# Patient Record
Sex: MALE | Race: WHITE | NOT HISPANIC OR LATINO
[De-identification: names, ages, dates, MRNs, and addresses within clinical notes are randomized per-mention and may not be internally consistent; named-entity substitution may affect disease eponyms.]

---

## 2022-01-04 PROBLEM — Z00.00 ENCOUNTER FOR PREVENTIVE HEALTH EXAMINATION: Status: ACTIVE | Noted: 2022-01-04

## 2022-01-14 RX ORDER — ASPIRIN ENTERIC COATED TABLETS 81 MG 81 MG/1
81 TABLET, DELAYED RELEASE ORAL
Qty: 30 | Refills: 0 | Status: ACTIVE | COMMUNITY

## 2022-01-14 RX ORDER — METFORMIN HYDROCHLORIDE 500 MG/1
500 TABLET, COATED ORAL TWICE DAILY
Refills: 0 | Status: ACTIVE | COMMUNITY

## 2022-01-14 RX ORDER — ICOSAPENT ETHYL 1000 MG/1
1 CAPSULE ORAL
Qty: 360 | Refills: 3 | Status: ACTIVE | COMMUNITY

## 2022-01-14 RX ORDER — ROSUVASTATIN CALCIUM 40 MG/1
40 TABLET, FILM COATED ORAL
Qty: 90 | Refills: 0 | Status: ACTIVE | COMMUNITY

## 2022-01-14 RX ORDER — EZETIMIBE 10 MG/1
10 TABLET ORAL
Qty: 30 | Refills: 0 | Status: ACTIVE | COMMUNITY

## 2022-01-14 RX ORDER — DAPAGLIFLOZIN 10 MG/1
10 TABLET, FILM COATED ORAL DAILY
Qty: 1 | Refills: 3 | Status: ACTIVE | COMMUNITY

## 2022-01-19 ENCOUNTER — APPOINTMENT (OUTPATIENT)
Dept: CARDIOTHORACIC SURGERY | Facility: CLINIC | Age: 62
End: 2022-01-19
Payer: COMMERCIAL

## 2022-01-19 ENCOUNTER — TRANSCRIPTION ENCOUNTER (OUTPATIENT)
Age: 62
End: 2022-01-19

## 2022-01-19 VITALS
BODY MASS INDEX: 33.34 KG/M2 | TEMPERATURE: 99 F | HEIGHT: 68 IN | HEART RATE: 71 BPM | SYSTOLIC BLOOD PRESSURE: 132 MMHG | DIASTOLIC BLOOD PRESSURE: 83 MMHG | RESPIRATION RATE: 16 BRPM | WEIGHT: 220 LBS | OXYGEN SATURATION: 97 %

## 2022-01-19 DIAGNOSIS — Z82.49 FAMILY HISTORY OF ISCHEMIC HEART DISEASE AND OTHER DISEASES OF THE CIRCULATORY SYSTEM: ICD-10-CM

## 2022-01-19 DIAGNOSIS — Z78.9 OTHER SPECIFIED HEALTH STATUS: ICD-10-CM

## 2022-01-19 DIAGNOSIS — I10 ESSENTIAL (PRIMARY) HYPERTENSION: ICD-10-CM

## 2022-01-19 DIAGNOSIS — E78.5 HYPERLIPIDEMIA, UNSPECIFIED: ICD-10-CM

## 2022-01-19 DIAGNOSIS — Z86.79 PERSONAL HISTORY OF OTHER DISEASES OF THE CIRCULATORY SYSTEM: ICD-10-CM

## 2022-01-19 DIAGNOSIS — I25.10 ATHEROSCLEROTIC HEART DISEASE OF NATIVE CORONARY ARTERY W/OUT ANGINA PECTORIS: ICD-10-CM

## 2022-01-19 DIAGNOSIS — E11.9 TYPE 2 DIABETES MELLITUS W/OUT COMPLICATIONS: ICD-10-CM

## 2022-01-19 DIAGNOSIS — Z86.711 PERSONAL HISTORY OF PULMONARY EMBOLISM: ICD-10-CM

## 2022-01-19 PROCEDURE — 99244 OFF/OP CNSLTJ NEW/EST MOD 40: CPT

## 2022-01-19 RX ORDER — ORAL SEMAGLUTIDE 14 MG/1
14 TABLET ORAL DAILY
Refills: 0 | Status: ACTIVE | COMMUNITY
Start: 2022-01-19

## 2022-01-19 RX ORDER — B-COMPLEX WITH VITAMIN C
TABLET ORAL DAILY
Refills: 0 | Status: ACTIVE | COMMUNITY
Start: 2022-01-19

## 2022-01-19 RX ORDER — INSULIN GLARGINE 300 U/ML
300 INJECTION, SOLUTION SUBCUTANEOUS DAILY
Refills: 0 | Status: ACTIVE | COMMUNITY
Start: 2022-01-19

## 2022-01-19 RX ORDER — ELECTROLYTES/DEXTROSE
SOLUTION, ORAL ORAL
Qty: 90 | Refills: 0 | Status: ACTIVE | COMMUNITY
Start: 2022-01-19

## 2022-01-19 RX ORDER — MULTIVIT-MIN/FOLIC/VIT K/LYCOP 400-300MCG
1000 TABLET ORAL DAILY
Refills: 0 | Status: ACTIVE | COMMUNITY
Start: 2022-01-19

## 2022-01-19 RX ORDER — ADHESIVE TAPE 3"X 2.3 YD
50 MCG TAPE, NON-MEDICATED TOPICAL DAILY
Refills: 0 | Status: ACTIVE | COMMUNITY
Start: 2022-01-19

## 2022-01-19 NOTE — REVIEW OF SYSTEMS
[Feeling Tired] : feeling tired [Shortness Of Breath] : shortness of breath [SOB on Exertion] : shortness of breath during exertion [Negative] : Heme/Lymph [Dizziness] : dizziness

## 2022-01-21 NOTE — CONSULT LETTER
[Dear  ___] : Dear  [unfilled], [FreeTextEntry2] : Dr.Lina Morgan [FreeTextEntry1] : I had the pleasure of seeing your patient, BECKY ESTRADA,in my office today. \par \par We take a multidisciplinary team approach to patient care and consider you, the referring physician, an extension of our team. We will maintain an open line of communication with you throughout your patient's treatment course. \par \par He  is being evaluated for CAD . I have reviewed all of the patient's medical records and diagnostic images at the time of his  office consultation. I have enclosed a copy for your records. \par \par 12/30/21 Cardiac Catheterization revealed distal LT main 10 to 30% stenosis, proximal, proximal to mid LAD 80% stenosis, ostial 1st diagonal 50% stenosis, ostial and proximal circumflex 50% stenosis, proximal OM3 100% stenosis, proximal % stenosis, proximal to mid RCA 50% stenosis, distal RCA 70% stenosis, mid RPDA 70% stenosis. \par \par 12/3/21 Nuclear stress test revealed a large size moderate intensity ischemia involving the apical anterior, apical septal,apical inferior and apical cap. Small size, moderate intensity base to mid inferolateral infarction with mild ayya-infarct ischemia in the surrounding areas. Decreased ejection fraction with stress and transient ischemic dilation were noted. METS 10.5. \par \par I have reviewed the indications for surgery and anatomy of the heart. The patient meets criteria for surgery. I have recommended that the patient is a candidate for a Coronary Artery Bypass Grafting . \par \par Surgery is scheduled for Coronary Artery Bypass Grafting on 2/16/22 .  I will update you on his perioperative status and disposition upon discharge. \par \par I appreciate the opportunity to care for your patient at the  [facility]. If there are any questions or concerns, please call me  at [phone].\par \par Please see my note below. \par \par Sincerely, \par \par \par \par \par Becky Chambers MD\par Director\par [facility]\par Professor \par Cardiovascular & Thoracic Surgery\par [employer] [employer].\par \par \par [facility]:\par Department of Cardiovascular and Thoracic Surgery\par [address] [place] [address], [address]\[facility] Office: [phone]\par Fax: [phone]\par \par [facility]\par  \par Department of Cardiovascular and Thoracic Surgery\par [address] [place] [address], [address]\par Phone: [phone]\[facility] Office: [phone]\par \par \par \par \par \par

## 2022-01-21 NOTE — END OF VISIT
[FreeTextEntry3] : \par I personally scribed for BECKY SANHCEZ on Jan 19 2022  3:30PM . \par \par \par \par \par Physician Attestation:\par Documented by DENNISE SEAY acting as a scribe for BECKY SANCHEZ 01/19/2022 . \par                 All medical record entries made by the Scribe were at my, BECKY SANCHEZ , direction and personally dictated by me on 01/19/2022 . I have reviewed the chart and agree that the record accurately reflects my personal performance of the history, physical exam, assessment and plan\par \par

## 2022-01-21 NOTE — HISTORY OF PRESENT ILLNESS
[FreeTextEntry1] : Mr. ESTRADA is a 61 year old male with  past medical history of T2DM, Hypertension, Hyperlipidemia, PE in the setting of foot surgery in 2019 ( was on Eliquis and completed 6 months of therapy, followed by ) and CAD with complaints of dyspnea on exertion , occasional dizziness and fatigue for 1 year . 12/3/21 Nuclear stress test revealed a large size moderate intensity ischemia involving the apical anterior, apical septal,apical inferior and apical cap. Small size, moderate intensity base to mid inferolateral infarction with mild yaya-infarct ischemia in the surrounding areas. Decreased ejection fraction with stress and transient ischemic dilation were noted. METS 10.5. He subsequently underwent cardiac catheterization on 21 which revealed distal LT main 10 to 30% stenosis, proximal, proximal to mid LAD 80% stenosis, ostial 1st diagonal 50% stenosis, ostial and proximal circumflex 50% stenosis, proximal OM3 100% stenosis, proximal % stenosis, proximal to mid RCA 50% stenosis, distal RCA 70% stenosis, mid RPDA 70% stenosis. He can walk 1 miles/ 2 FOS without shortness of breath. He uses 2 pillows to sleep. He walk the dog 1/2 a mile every day.  He is initially referred by Dr. Jessica Morgan for initial evaluation and management for CAD. \par Family History: Mother  due to CHF at the age of 63 , Brother  due to heart attack at the age of 43 , Sister has PCI with stents \par \par \par COVID VACCINE: Moderna booster on 2021 \par

## 2022-01-21 NOTE — PHYSICAL EXAM
[General Appearance - Alert] : alert [General Appearance - In No Acute Distress] : in no acute distress [General Appearance - Well Nourished] : well nourished [General Appearance - Well Developed] : well developed [Sclera] : the sclera and conjunctiva were normal [PERRL With Normal Accommodation] : pupils were equal in size, round, and reactive to light [Outer Ear] : the ears and nose were normal in appearance [Hearing Threshold Finger Rub Not Randall] : hearing was normal [Both Tympanic Membranes Were Examined] : both tympanic membranes were normal [Neck Appearance] : the appearance of the neck was normal [] : no respiratory distress [Respiration, Rhythm And Depth] : normal respiratory rhythm and effort [Auscultation Breath Sounds / Voice Sounds] : lungs were clear to auscultation bilaterally [Apical Impulse] : the apical impulse was normal [Heart Rate And Rhythm] : heart rate was normal and rhythm regular [Heart Sounds] : normal S1 and S2 [Systolic grade ___/6] : A grade [unfilled]/6 systolic murmur was heard. [Examination Of The Chest] : the chest was normal in appearance [2+] : left 2+ [Breast Appearance] : normal in appearance [Bowel Sounds] : normal bowel sounds [Abdomen Soft] : soft [No CVA Tenderness] : no ~M costovertebral angle tenderness [Abnormal Walk] : normal gait [Involuntary Movements] : no involuntary movements were seen [Skin Color & Pigmentation] : normal skin color and pigmentation [Skin Turgor] : normal skin turgor [No Focal Deficits] : no focal deficits [Oriented To Time, Place, And Person] : oriented to person, place, and time [Impaired Insight] : insight and judgment were intact [Affect] : the affect was normal [Mood] : the mood was normal [Memory Recent] : recent memory was not impaired [Memory Remote] : remote memory was not impaired [FreeTextEntry1] : Deferred

## 2022-01-21 NOTE — ASSESSMENT
[FreeTextEntry1] : Mr. ESTRADA is a 61 year old male with  past medical history of T2DM, Hypertension, Hyperlipidemia, PE in the setting of foot surgery in 2019 ( was on Eliquis and completed 6 months of therapy, followed by ) and CAD with complaints of dyspnea on exertion , occasional dizziness and fatigue for 1 year . 12/3/21 Nuclear stress test revealed a large size moderate intensity ischemia involving the apical anterior, apical septal,apical inferior and apical cap. Small size, moderate intensity base to mid inferolateral infarction with mild yaya-infarct ischemia in the surrounding areas. Decreased ejection fraction with stress and transient ischemic dilation were noted. METS 10.5. He subsequently underwent cardiac catheterization on 21 which revealed distal LT main 10 to 30% stenosis, proximal, proximal to mid LAD 80% stenosis, ostial 1st diagonal 50% stenosis, ostial and proximal circumflex 50% stenosis, proximal OM3 100% stenosis, proximal % stenosis, proximal to mid RCA 50% stenosis, distal RCA 70% stenosis, mid RPDA 70% stenosis. He can walk 1 miles/ 2 FOS without shortness of breath. He uses 2 pillows to sleep. He walk the dog 1/2 a mile every day.  He is initially referred by Dr. Jessica Morgan for initial evaluation and management for CAD. \par Family History: Mother  due to CHF at the age of 63 , Brother  due to heart attack at the age of 43 , Sister has PCI with stents \par \par \par  reviewed the cardiac imaging, medical records and reports with patient and discussed the case. 21 Cardiac Catheterization revealed distal LT main 10 to 30% stenosis, proximal, proximal to mid LAD 80% stenosis, ostial 1st diagonal 50% stenosis, ostial and proximal circumflex 50% stenosis, proximal OM3 100% stenosis, proximal % stenosis, proximal to mid RCA 50% stenosis, distal RCA 70% stenosis, mid RPDA 70% stenosis. \par \par 12/3/21 Nuclear stress test revealed a large size moderate intensity ischemia involving the apical anterior, apical septal,apical inferior and apical cap. Small size, moderate intensity base to mid inferolateral infarction with mild yaya-infarct ischemia in the surrounding areas. Decreased ejection fraction with stress and transient ischemic dilation were noted. METS 10.5. \par \par  discussed the risks , benefits and alternatives to surgery. Risks included but not limited to  bleeding , stroke, Myocardial Infarction, kidney problems,Blood transfusion ,permanent  pacemaker implantation,  infections and death.   quoted a low (< 5%) operative mortality and complication risks.  also discussed the various approaches in detail. feel that the patient will benefit and is a candidate for a Coronary Artery Bypass Grafting . All questions and concerns were addressed and patient agrees to proceed with  surgery on 22. \par \par \par Plan:\par 1) Coronary Artery Bypass Grafting on 22 \par 2)  Stop Aspirin 7 days before scheduled surgery date.\par

## 2022-01-21 NOTE — DATA REVIEWED
[FreeTextEntry1] : 12/30/21 Cardiac Catheterization revealed distal LT main 10 to 30% stenosis, proximal, proximal to mid LAD 80% stenosis, ostial 1st diagonal 50% stenosis, ostial and proximal circumflex 50% stenosis, proximal OM3 100% stenosis, proximal % stenosis, proximal to mid RCA 50% stenosis, distal RCA 70% stenosis, mid RPDA 70% stenosis. \par \par 12/3/21 Nuclear stress test revealed a large size moderate intensity ischemia involving the apical anterior, apical septal,apical inferior and apical cap. Small size, moderate intensity base to mid inferolateral infarction with mild yaya-infarct ischemia in the surrounding areas. Decreased ejection fraction with stress and transient ischemic dilation were noted. METS 10.5.

## 2022-02-14 ENCOUNTER — OUTPATIENT (OUTPATIENT)
Dept: OUTPATIENT SERVICES | Facility: HOSPITAL | Age: 62
LOS: 1 days | End: 2022-02-14
Payer: COMMERCIAL

## 2022-02-14 VITALS
OXYGEN SATURATION: 97 % | HEIGHT: 68 IN | SYSTOLIC BLOOD PRESSURE: 121 MMHG | TEMPERATURE: 99 F | HEART RATE: 80 BPM | RESPIRATION RATE: 18 BRPM | DIASTOLIC BLOOD PRESSURE: 85 MMHG | WEIGHT: 218.04 LBS

## 2022-02-14 DIAGNOSIS — Z98.49 CATARACT EXTRACTION STATUS, UNSPECIFIED EYE: Chronic | ICD-10-CM

## 2022-02-14 DIAGNOSIS — G47.33 OBSTRUCTIVE SLEEP APNEA (ADULT) (PEDIATRIC): ICD-10-CM

## 2022-02-14 DIAGNOSIS — Z87.81 PERSONAL HISTORY OF (HEALED) TRAUMATIC FRACTURE: Chronic | ICD-10-CM

## 2022-02-14 DIAGNOSIS — Z98.890 OTHER SPECIFIED POSTPROCEDURAL STATES: Chronic | ICD-10-CM

## 2022-02-14 DIAGNOSIS — Z11.52 ENCOUNTER FOR SCREENING FOR COVID-19: ICD-10-CM

## 2022-02-14 DIAGNOSIS — Z01.818 ENCOUNTER FOR OTHER PREPROCEDURAL EXAMINATION: ICD-10-CM

## 2022-02-14 DIAGNOSIS — Z29.9 ENCOUNTER FOR PROPHYLACTIC MEASURES, UNSPECIFIED: ICD-10-CM

## 2022-02-14 DIAGNOSIS — Z90.89 ACQUIRED ABSENCE OF OTHER ORGANS: Chronic | ICD-10-CM

## 2022-02-14 DIAGNOSIS — I25.10 ATHEROSCLEROTIC HEART DISEASE OF NATIVE CORONARY ARTERY WITHOUT ANGINA PECTORIS: ICD-10-CM

## 2022-02-14 DIAGNOSIS — E11.9 TYPE 2 DIABETES MELLITUS WITHOUT COMPLICATIONS: ICD-10-CM

## 2022-02-14 LAB
A1C WITH ESTIMATED AVERAGE GLUCOSE RESULT: 8.4 % — HIGH (ref 4–5.6)
ANION GAP SERPL CALC-SCNC: 16 MMOL/L — SIGNIFICANT CHANGE UP (ref 5–17)
BLD GP AB SCN SERPL QL: NEGATIVE — SIGNIFICANT CHANGE UP
BUN SERPL-MCNC: 21 MG/DL — SIGNIFICANT CHANGE UP (ref 7–23)
CALCIUM SERPL-MCNC: 10.4 MG/DL — SIGNIFICANT CHANGE UP (ref 8.4–10.5)
CHLORIDE SERPL-SCNC: 103 MMOL/L — SIGNIFICANT CHANGE UP (ref 96–108)
CO2 SERPL-SCNC: 20 MMOL/L — LOW (ref 22–31)
CREAT SERPL-MCNC: 0.91 MG/DL — SIGNIFICANT CHANGE UP (ref 0.5–1.3)
ESTIMATED AVERAGE GLUCOSE: 194 MG/DL — HIGH (ref 68–114)
GLUCOSE SERPL-MCNC: 312 MG/DL — HIGH (ref 70–99)
HCT VFR BLD CALC: 45.8 % — SIGNIFICANT CHANGE UP (ref 39–50)
HGB BLD-MCNC: 15.1 G/DL — SIGNIFICANT CHANGE UP (ref 13–17)
MCHC RBC-ENTMCNC: 28.3 PG — SIGNIFICANT CHANGE UP (ref 27–34)
MCHC RBC-ENTMCNC: 33 GM/DL — SIGNIFICANT CHANGE UP (ref 32–36)
MCV RBC AUTO: 85.8 FL — SIGNIFICANT CHANGE UP (ref 80–100)
MRSA PCR RESULT.: SIGNIFICANT CHANGE UP
NRBC # BLD: 0 /100 WBCS — SIGNIFICANT CHANGE UP (ref 0–0)
PLATELET # BLD AUTO: 208 K/UL — SIGNIFICANT CHANGE UP (ref 150–400)
POTASSIUM SERPL-MCNC: 4.5 MMOL/L — SIGNIFICANT CHANGE UP (ref 3.5–5.3)
POTASSIUM SERPL-SCNC: 4.5 MMOL/L — SIGNIFICANT CHANGE UP (ref 3.5–5.3)
RBC # BLD: 5.34 M/UL — SIGNIFICANT CHANGE UP (ref 4.2–5.8)
RBC # FLD: 14 % — SIGNIFICANT CHANGE UP (ref 10.3–14.5)
RH IG SCN BLD-IMP: POSITIVE — SIGNIFICANT CHANGE UP
S AUREUS DNA NOSE QL NAA+PROBE: DETECTED
SARS-COV-2 RNA SPEC QL NAA+PROBE: SIGNIFICANT CHANGE UP
SODIUM SERPL-SCNC: 139 MMOL/L — SIGNIFICANT CHANGE UP (ref 135–145)
WBC # BLD: 6.27 K/UL — SIGNIFICANT CHANGE UP (ref 3.8–10.5)
WBC # FLD AUTO: 6.27 K/UL — SIGNIFICANT CHANGE UP (ref 3.8–10.5)

## 2022-02-14 PROCEDURE — 85027 COMPLETE CBC AUTOMATED: CPT

## 2022-02-14 PROCEDURE — C9803: CPT

## 2022-02-14 PROCEDURE — 93880 EXTRACRANIAL BILAT STUDY: CPT

## 2022-02-14 PROCEDURE — 71046 X-RAY EXAM CHEST 2 VIEWS: CPT

## 2022-02-14 PROCEDURE — 71046 X-RAY EXAM CHEST 2 VIEWS: CPT | Mod: 26

## 2022-02-14 PROCEDURE — G0463: CPT

## 2022-02-14 PROCEDURE — 80048 BASIC METABOLIC PNL TOTAL CA: CPT

## 2022-02-14 PROCEDURE — 86900 BLOOD TYPING SEROLOGIC ABO: CPT

## 2022-02-14 PROCEDURE — 83036 HEMOGLOBIN GLYCOSYLATED A1C: CPT

## 2022-02-14 PROCEDURE — U0003: CPT

## 2022-02-14 PROCEDURE — 87641 MR-STAPH DNA AMP PROBE: CPT

## 2022-02-14 PROCEDURE — 86850 RBC ANTIBODY SCREEN: CPT

## 2022-02-14 PROCEDURE — 87640 STAPH A DNA AMP PROBE: CPT

## 2022-02-14 PROCEDURE — U0005: CPT

## 2022-02-14 PROCEDURE — 93880 EXTRACRANIAL BILAT STUDY: CPT | Mod: 26

## 2022-02-14 PROCEDURE — 86901 BLOOD TYPING SEROLOGIC RH(D): CPT

## 2022-02-14 RX ORDER — CEFUROXIME AXETIL 250 MG
1500 TABLET ORAL ONCE
Refills: 0 | Status: COMPLETED | OUTPATIENT
Start: 2022-02-16 | End: 2022-02-16

## 2022-02-14 NOTE — H&P PST ADULT - NSANTHOSAYNRD_GEN_A_CORE
Oriented - self; Oriented - place; Oriented - time No. ERNESTINE screening performed.  STOP BANG Legend: 0-2 = LOW Risk; 3-4 = INTERMEDIATE Risk; 5-8 = HIGH Risk

## 2022-02-14 NOTE — H&P PST ADULT - ASSESSMENT
CAPRINI SCORE [CLOT]    AGE RELATED RISK FACTORS                                                       MOBILITY RELATED FACTORS  [ ] Age 41-60 years                                            (1 Point)                  [ ] Bed rest                                                        (1 Point)  [ x] Age: 61-74 years                                           (2 Points)                 [ ] Plaster cast                                                   (2 Points)  [ ] Age= 75 years                                              (3 Points)                 [ ] Bed bound for more than 72 hours                 (2 Points)    DISEASE RELATED RISK FACTORS                                               GENDER SPECIFIC FACTORS  [ ] Edema in the lower extremities                       (1 Point)                  [ ] Pregnancy                                                     (1 Point)  [ ] Varicose veins                                               (1 Point)                  [ ] Post-partum < 6 weeks                                   (1 Point)             [x ] BMI > 25 Kg/m2                                            (1 Point)                  [ ] Hormonal therapy  or oral contraception          (1 Point)                 [ ] Sepsis (in the previous month)                        (1 Point)                  [ ] History of pregnancy complications                 (1 point)  [ ] Pneumonia or serious lung disease                                               [ ] Unexplained or recurrent                     (1 Point)           (in the previous month)                               (1 Point)  [ ] Abnormal pulmonary function test                     (1 Point)                 SURGERY RELATED RISK FACTORS  [ ] Acute myocardial infarction                              (1 Point)                 [ ]  Section                                             (1 Point)  [ ] Congestive heart failure (in the previous month)  (1 Point)               [ ] Minor surgery                                                  (1 Point)   [ ] Inflammatory bowel disease                             (1 Point)                 [ ] Arthroscopic surgery                                        (2 Points)  [ ] Central venous access                                      (2 Points)                [x ] General surgery lasting more than 45 minutes   (2 Points)       [ ] Stroke (in the previous month)                          (5 Points)               [ ] Elective arthroplasty                                         (5 Points)                                                                                                                                               HEMATOLOGY RELATED FACTORS                                                 TRAUMA RELATED RISK FACTORS  [ ] Prior episodes of VTE                                     (3 Points)                [ ] Fracture of the hip, pelvis, or leg                       (5 Points)  [ ] Positive family history for VTE                         (3 Points)                 [ ] Acute spinal cord injury (in the previous month)  (5 Points)  [ ] Prothrombin 81789 A                                     (3 Points)                 [ ] Paralysis  (less than 1 month)                             (5 Points)  [ ] Factor V Leiden                                             (3 Points)                  [ ] Multiple Trauma within 1 month                        (5 Points)  [ ] Lupus anticoagulants                                     (3 Points)                                                           [ ] Anticardiolipin antibodies                               (3 Points)                                                       [ ] High homocysteine in the blood                      (3 Points)                                             [ ] Other congenital or acquired thrombophilia      (3 Points)                                                [ ] Heparin induced thrombocytopenia                  (3 Points)                                          Total Score [          ]    Caprini Score 0 - 2:  Low Risk, No VTE Prophylaxis required for most patients, encourage ambulation  Caprini Score 3 - 6:  At Risk, pharmacologic VTE prophylaxis is indicated for most patients (in the absence of a contraindication)  Caprini Score Greater than or = 7:  High Risk, pharmacologic VTE prophylaxis is indicated for most patients (in the absence of a contraindication)

## 2022-02-14 NOTE — H&P PST ADULT - HISTORY OF PRESENT ILLNESS
62 yo male     Patient denies previous Covid19 infection/exposure,recent travel,fevers,chills,cough,SOB,loss of sense of smell/taste.  Covid19 PCR performed at [facility] today. 60 yo male [email or web address] prior to scheduled CABG x 3 on 2/16/22 with Dr. Moi Chambers. Pmhx includes HTN, HLD, CAD, DM, ERNESTINE precautions, GERD, history of DVT in LLE (post op ankle surgery in 2019, was on Eliquis x 6 months and then cleared). Reports he feels "fatigued" and ALEJO after working; over the past few months. He denies chest pain, palpitations, SOB at rest, dizziness, syncope. Had a cardiac angiogram on 12/30/2021 which revealed " LT main 10 to 30% stenosis, proximal, proximal to mid LAD 80% stenosis, ostial 1st diagonal 50% stenosis, ostial and proximal circumflex 50% stenosis, proximal OM3 100% stenosis, proximal % stenosis, proximal to mid RCA 50% stenosis, distal RCA 70% stenosis, mid RPDA 70% stenosis." He was recently evaluated by Dr. Chambers and above surgery was recommended.    Patient denies previous Covid19 infection/exposure,recent travel,fevers,chills,cough,SOB,loss of sense of smell/taste.  Covid19 PCR performed at [facility] today.

## 2022-02-14 NOTE — H&P PST ADULT - HEALTH CARE MAINTENANCE
Did not get flu vaccine Doxepin Pregnancy And Lactation Text: This medication is Pregnancy Category C and it isn't known if it is safe during pregnancy. It is also excreted in breast milk and breast feeding isn't recommended.

## 2022-02-14 NOTE — H&P PST ADULT - NSICDXPASTMEDICALHX_GEN_ALL_CORE_FT
PAST MEDICAL HISTORY:  CAD (coronary artery disease)     Diabetes     GERD (gastroesophageal reflux disease)     History of DVT in adulthood 2019, post op in LLE; treated with Eliquis x 6 months, now in ASA 81    HLD (hyperlipidemia)     HTN (hypertension)

## 2022-02-14 NOTE — H&P PST ADULT - NSICDXPASTSURGICALHX_GEN_ALL_CORE_FT
PAST SURGICAL HISTORY:  H/O colonoscopy     H/O fracture of arm age 10, LUE    H/O fracture of foot screw and bone graft in left foot, 2019, complicated w/ DVT in LLE    History of tonsillectomy     S/P cataract extraction and insertion of intraocular lens

## 2022-02-14 NOTE — H&P PST ADULT - FALL HARM RISK - UNIVERSAL INTERVENTIONS
Bed in lowest position, wheels locked, appropriate side rails in place/Call bell, personal items and telephone in reach/Instruct patient to call for assistance before getting out of bed or chair/Non-slip footwear when patient is out of bed/[place] to call system/Physically safe environment - no spills, clutter or unnecessary equipment/Purposeful Proactive Rounding/Room/bathroom lighting operational, light cord in reach

## 2022-02-14 NOTE — H&P PST ADULT - PROBLEM SELECTOR PLAN 1
CABG x 3 on 2/16/2022 with Dr. Moi Chambers.  Pre-op instructions given. Questions answered.  Covid19 PCR performed at [facility] today.  Last dose of aspirin 2/9/22.

## 2022-02-14 NOTE — H&P PST ADULT - PROBLEM SELECTOR PLAN 4
Instructed to hold all antiglycemic meds DOS. Last dose of Farxiga 2/14/22 (patient took this morning), last dose of metformin 2/15/22 AM.

## 2022-02-14 NOTE — H&P PST ADULT - NSICDXFAMILYHX_GEN_ALL_CORE_FT
FAMILY HISTORY:  FH: heart disease, MI (brother,  age 45), CHF (mother,  age 62)    Mother  Still living? Unknown  FH: type 2 diabetes, Age at diagnosis: Age Unknown    Sibling  Still living? Unknown  FH: ovarian cancer, Age at diagnosis: Age Unknown     FAMILY HISTORY:  FH: heart disease, MI (brother,  age 45), CHF (mother,  age 62)    Mother  Still living? Unknown  FH: type 2 diabetes, Age at diagnosis: Age Unknown    Sibling  Still living? Unknown  Family history of other condition, Age at diagnosis: Age Unknown  FH: ovarian cancer, Age at diagnosis: Age Unknown    Child  Still living? Unknown  FH: arrhythmia, Age at diagnosis: Age Unknown

## 2022-02-14 NOTE — H&P PST ADULT - OTHER CARE PROVIDERS
Endocrinologist - Dr. Nay Sun [phone], Cardiologist - Dr. Jessica Morgan, Orthopedist - Dr. Nini Coffman, Dematologist - Tanisha Carney

## 2022-02-15 DIAGNOSIS — Z22.321 CARRIER OR SUSPECTED CARRIER OF METHICILLIN SUSCEPTIBLE STAPHYLOCOCCUS AUREUS: ICD-10-CM

## 2022-02-15 NOTE — PRE-ANESTHESIA EVALUATION ADULT - NSANTHPMHFT_GEN_ALL_CORE
62 yo M w/ PMHx of DM2, HTN, HLD, hx of DVT/PE (circa foot surgery in 2019, GERD, and CAD with complaints of dyspnea on exertion , occasional dizziness and fatigue for 1 year. Underwent nuclear stress test as outpatient that was positive. Cardiac Cath reveals severe 3 vessel disease. Presents today for CABG. 60 yo M w/ PMHx of DM2, HTN, HLD, hx of DVT/PE (circa foot surgery in 2019, GERD, and CAD with complaints of dyspnea on exertion , occasional dizziness and fatigue for 1 year. Underwent nuclear stress test as outpatient that was positive. Cardiac Cath reveals severe 3 vessel disease. Presents today for CABG.    Denies active CP/SOB/Orthopnea  Denies hx of MI/CHF

## 2022-02-15 NOTE — PRE-ANESTHESIA EVALUATION ADULT - NSRADCARDRESULTSFT_GEN_ALL_CORE
12/3/21 Nuclear stress test revealed a large size moderate intensity ischemia involving the apical anterior, apical septal,apical inferior and apical cap. Small size, moderate intensity base to mid inferolateral infarction with mild yaya-infarct ischemia in the surrounding areas. Decreased ejection fraction with stress and transient ischemic dilation were noted. METS 10.5.     12/30/21 cardiac catheterization revealed distal LT main 10 to 30% stenosis, proximal, proximal to mid LAD 80% stenosis, ostial 1st diagonal 50% stenosis, ostial and proximal circumflex 50% stenosis, proximal OM3 100% stenosis, proximal % stenosis, proximal to mid RCA 50% stenosis, distal RCA 70% stenosis, mid RPDA 70% stenosis.

## 2022-02-16 ENCOUNTER — INPATIENT (INPATIENT)
Facility: HOSPITAL | Age: 62
LOS: 3 days | Discharge: HOME HEALTH | DRG: 235 | End: 2022-02-20
Attending: THORACIC SURGERY (CARDIOTHORACIC VASCULAR SURGERY) | Admitting: THORACIC SURGERY (CARDIOTHORACIC VASCULAR SURGERY)
Payer: COMMERCIAL

## 2022-02-16 ENCOUNTER — APPOINTMENT (OUTPATIENT)
Dept: CARDIOTHORACIC SURGERY | Facility: HOSPITAL | Age: 62
End: 2022-02-16

## 2022-02-16 VITALS
HEART RATE: 72 BPM | HEIGHT: 68 IN | TEMPERATURE: 99 F | RESPIRATION RATE: 18 BRPM | SYSTOLIC BLOOD PRESSURE: 152 MMHG | DIASTOLIC BLOOD PRESSURE: 89 MMHG | OXYGEN SATURATION: 97 % | WEIGHT: 220.68 LBS

## 2022-02-16 DIAGNOSIS — Z98.49 CATARACT EXTRACTION STATUS, UNSPECIFIED EYE: Chronic | ICD-10-CM

## 2022-02-16 DIAGNOSIS — Z87.81 PERSONAL HISTORY OF (HEALED) TRAUMATIC FRACTURE: Chronic | ICD-10-CM

## 2022-02-16 DIAGNOSIS — Z90.89 ACQUIRED ABSENCE OF OTHER ORGANS: Chronic | ICD-10-CM

## 2022-02-16 DIAGNOSIS — I25.10 ATHEROSCLEROTIC HEART DISEASE OF NATIVE CORONARY ARTERY WITHOUT ANGINA PECTORIS: ICD-10-CM

## 2022-02-16 DIAGNOSIS — Z98.890 OTHER SPECIFIED POSTPROCEDURAL STATES: Chronic | ICD-10-CM

## 2022-02-16 LAB
ALBUMIN SERPL ELPH-MCNC: 4.2 G/DL — SIGNIFICANT CHANGE UP (ref 3.3–5)
ALP SERPL-CCNC: 26 U/L — LOW (ref 40–120)
ALT FLD-CCNC: 16 U/L — SIGNIFICANT CHANGE UP (ref 10–45)
ANION GAP SERPL CALC-SCNC: 13 MMOL/L — SIGNIFICANT CHANGE UP (ref 5–17)
APTT BLD: 26.1 SEC — LOW (ref 27.5–35.5)
AST SERPL-CCNC: 33 U/L — SIGNIFICANT CHANGE UP (ref 10–40)
BASE EXCESS BLDV CALC-SCNC: -0.6 MMOL/L — SIGNIFICANT CHANGE UP (ref -2–2)
BASE EXCESS BLDV CALC-SCNC: -1.6 MMOL/L — SIGNIFICANT CHANGE UP (ref -2–2)
BASE EXCESS BLDV CALC-SCNC: -2 MMOL/L — SIGNIFICANT CHANGE UP (ref -2–2)
BASE EXCESS BLDV CALC-SCNC: -2.3 MMOL/L — LOW (ref -2–2)
BASE EXCESS BLDV CALC-SCNC: -3.9 MMOL/L — LOW (ref -2–2)
BASOPHILS # BLD AUTO: 0.01 K/UL — SIGNIFICANT CHANGE UP (ref 0–0.2)
BASOPHILS NFR BLD AUTO: 0.1 % — SIGNIFICANT CHANGE UP (ref 0–2)
BILIRUB SERPL-MCNC: 0.7 MG/DL — SIGNIFICANT CHANGE UP (ref 0.2–1.2)
BLOOD GAS VENOUS - CREATININE: SIGNIFICANT CHANGE UP MG/DL (ref 0.5–1.3)
BUN SERPL-MCNC: 20 MG/DL — SIGNIFICANT CHANGE UP (ref 7–23)
CA-I SERPL-SCNC: 1.09 MMOL/L — LOW (ref 1.15–1.33)
CA-I SERPL-SCNC: 1.12 MMOL/L — LOW (ref 1.15–1.33)
CA-I SERPL-SCNC: 1.21 MMOL/L — SIGNIFICANT CHANGE UP (ref 1.15–1.33)
CA-I SERPL-SCNC: 1.59 MMOL/L — HIGH (ref 1.15–1.33)
CA-I SERPL-SCNC: 1.59 MMOL/L — HIGH (ref 1.15–1.33)
CALCIUM SERPL-MCNC: 9 MG/DL — SIGNIFICANT CHANGE UP (ref 8.4–10.5)
CHLORIDE BLDV-SCNC: 106 MMOL/L — SIGNIFICANT CHANGE UP (ref 96–108)
CHLORIDE BLDV-SCNC: 107 MMOL/L — SIGNIFICANT CHANGE UP (ref 96–108)
CHLORIDE BLDV-SCNC: 110 MMOL/L — HIGH (ref 96–108)
CHLORIDE SERPL-SCNC: 110 MMOL/L — HIGH (ref 96–108)
CK MB BLD-MCNC: 7.9 % — HIGH (ref 0–3.5)
CK MB CFR SERPL CALC: 23.6 NG/ML — HIGH (ref 0–6.7)
CK SERPL-CCNC: 300 U/L — HIGH (ref 30–200)
CO2 BLDV-SCNC: 25 MMOL/L — SIGNIFICANT CHANGE UP (ref 22–26)
CO2 BLDV-SCNC: 26 MMOL/L — SIGNIFICANT CHANGE UP (ref 22–26)
CO2 BLDV-SCNC: 26 MMOL/L — SIGNIFICANT CHANGE UP (ref 22–26)
CO2 BLDV-SCNC: 27 MMOL/L — HIGH (ref 22–26)
CO2 BLDV-SCNC: 27 MMOL/L — HIGH (ref 22–26)
CO2 SERPL-SCNC: 21 MMOL/L — LOW (ref 22–31)
CREAT SERPL-MCNC: 0.76 MG/DL — SIGNIFICANT CHANGE UP (ref 0.5–1.3)
EOSINOPHIL # BLD AUTO: 0.02 K/UL — SIGNIFICANT CHANGE UP (ref 0–0.5)
EOSINOPHIL NFR BLD AUTO: 0.2 % — SIGNIFICANT CHANGE UP (ref 0–6)
FIBRINOGEN PPP-MCNC: 248 MG/DL — LOW (ref 290–520)
GAS PNL BLDA: SIGNIFICANT CHANGE UP
GAS PNL BLDV: 137 MMOL/L — SIGNIFICANT CHANGE UP (ref 136–145)
GAS PNL BLDV: 139 MMOL/L — SIGNIFICANT CHANGE UP (ref 136–145)
GAS PNL BLDV: 139 MMOL/L — SIGNIFICANT CHANGE UP (ref 136–145)
GAS PNL BLDV: 140 MMOL/L — SIGNIFICANT CHANGE UP (ref 136–145)
GAS PNL BLDV: 140 MMOL/L — SIGNIFICANT CHANGE UP (ref 136–145)
GAS PNL BLDV: SIGNIFICANT CHANGE UP
GLUCOSE BLDC GLUCOMTR-MCNC: 131 MG/DL — HIGH (ref 70–99)
GLUCOSE BLDC GLUCOMTR-MCNC: 135 MG/DL — HIGH (ref 70–99)
GLUCOSE BLDC GLUCOMTR-MCNC: 144 MG/DL — HIGH (ref 70–99)
GLUCOSE BLDC GLUCOMTR-MCNC: 145 MG/DL — HIGH (ref 70–99)
GLUCOSE BLDC GLUCOMTR-MCNC: 149 MG/DL — HIGH (ref 70–99)
GLUCOSE BLDC GLUCOMTR-MCNC: 149 MG/DL — HIGH (ref 70–99)
GLUCOSE BLDC GLUCOMTR-MCNC: 156 MG/DL — HIGH (ref 70–99)
GLUCOSE BLDC GLUCOMTR-MCNC: 171 MG/DL — HIGH (ref 70–99)
GLUCOSE BLDC GLUCOMTR-MCNC: 174 MG/DL — HIGH (ref 70–99)
GLUCOSE BLDC GLUCOMTR-MCNC: 176 MG/DL — HIGH (ref 70–99)
GLUCOSE BLDC GLUCOMTR-MCNC: 271 MG/DL — HIGH (ref 70–99)
GLUCOSE BLDV-MCNC: 137 MG/DL — HIGH (ref 70–99)
GLUCOSE BLDV-MCNC: 143 MG/DL — HIGH (ref 70–99)
GLUCOSE BLDV-MCNC: 143 MG/DL — HIGH (ref 70–99)
GLUCOSE BLDV-MCNC: 147 MG/DL — HIGH (ref 70–99)
GLUCOSE BLDV-MCNC: 201 MG/DL — HIGH (ref 70–99)
GLUCOSE SERPL-MCNC: 206 MG/DL — HIGH (ref 70–99)
HCO3 BLDV-SCNC: 23 MMOL/L — SIGNIFICANT CHANGE UP (ref 22–29)
HCO3 BLDV-SCNC: 25 MMOL/L — SIGNIFICANT CHANGE UP (ref 22–29)
HCT VFR BLD CALC: 33.8 % — LOW (ref 39–50)
HCT VFR BLDA CALC: 29 % — LOW (ref 39–51)
HCT VFR BLDA CALC: 31 % — LOW (ref 39–51)
HCT VFR BLDA CALC: 38 % — LOW (ref 39–51)
HGB BLD CALC-MCNC: 10.2 G/DL — LOW (ref 12.6–17.4)
HGB BLD CALC-MCNC: 12.6 G/DL — SIGNIFICANT CHANGE UP (ref 12.6–17.4)
HGB BLD CALC-MCNC: 9.7 G/DL — LOW (ref 12.6–17.4)
HGB BLD CALC-MCNC: 9.8 G/DL — LOW (ref 12.6–17.4)
HGB BLD CALC-MCNC: 9.8 G/DL — LOW (ref 12.6–17.4)
HGB BLD-MCNC: 11.4 G/DL — LOW (ref 13–17)
IMM GRANULOCYTES NFR BLD AUTO: 0.6 % — SIGNIFICANT CHANGE UP (ref 0–1.5)
INR BLD: 1.15 RATIO — SIGNIFICANT CHANGE UP (ref 0.88–1.16)
LACTATE BLDV-MCNC: 1 MMOL/L — SIGNIFICANT CHANGE UP (ref 0.7–2)
LACTATE BLDV-MCNC: 1.1 MMOL/L — SIGNIFICANT CHANGE UP (ref 0.7–2)
LACTATE BLDV-MCNC: 1.1 MMOL/L — SIGNIFICANT CHANGE UP (ref 0.7–2)
LACTATE BLDV-MCNC: 1.2 MMOL/L — SIGNIFICANT CHANGE UP (ref 0.7–2)
LACTATE BLDV-MCNC: 1.3 MMOL/L — SIGNIFICANT CHANGE UP (ref 0.7–2)
LYMPHOCYTES # BLD AUTO: 0.75 K/UL — LOW (ref 1–3.3)
LYMPHOCYTES # BLD AUTO: 8.8 % — LOW (ref 13–44)
MAGNESIUM SERPL-MCNC: 2.8 MG/DL — HIGH (ref 1.6–2.6)
MCHC RBC-ENTMCNC: 29.4 PG — SIGNIFICANT CHANGE UP (ref 27–34)
MCHC RBC-ENTMCNC: 33.7 GM/DL — SIGNIFICANT CHANGE UP (ref 32–36)
MCV RBC AUTO: 87.1 FL — SIGNIFICANT CHANGE UP (ref 80–100)
MONOCYTES # BLD AUTO: 0.62 K/UL — SIGNIFICANT CHANGE UP (ref 0–0.9)
MONOCYTES NFR BLD AUTO: 7.3 % — SIGNIFICANT CHANGE UP (ref 2–14)
NEUTROPHILS # BLD AUTO: 7.03 K/UL — SIGNIFICANT CHANGE UP (ref 1.8–7.4)
NEUTROPHILS NFR BLD AUTO: 83 % — HIGH (ref 43–77)
NRBC # BLD: 0 /100 WBCS — SIGNIFICANT CHANGE UP (ref 0–0)
PCO2 BLDV: 47 MMHG — SIGNIFICANT CHANGE UP (ref 42–55)
PCO2 BLDV: 50 MMHG — SIGNIFICANT CHANGE UP (ref 42–55)
PCO2 BLDV: 50 MMHG — SIGNIFICANT CHANGE UP (ref 42–55)
PCO2 BLDV: 51 MMHG — SIGNIFICANT CHANGE UP (ref 42–55)
PCO2 BLDV: 52 MMHG — SIGNIFICANT CHANGE UP (ref 42–55)
PH BLDV: 7.26 — LOW (ref 7.32–7.43)
PH BLDV: 7.3 — LOW (ref 7.32–7.43)
PH BLDV: 7.34 — SIGNIFICANT CHANGE UP (ref 7.32–7.43)
PHOSPHATE SERPL-MCNC: 2.5 MG/DL — SIGNIFICANT CHANGE UP (ref 2.5–4.5)
PLATELET # BLD AUTO: 118 K/UL — LOW (ref 150–400)
PO2 BLDV: 50 MMHG — HIGH (ref 25–45)
PO2 BLDV: 62 MMHG — HIGH (ref 25–45)
PO2 BLDV: 62 MMHG — HIGH (ref 25–45)
PO2 BLDV: 65 MMHG — HIGH (ref 25–45)
PO2 BLDV: 91 MMHG — HIGH (ref 25–45)
POTASSIUM BLDV-SCNC: 3.8 MMOL/L — SIGNIFICANT CHANGE UP (ref 3.5–5.1)
POTASSIUM BLDV-SCNC: 4.3 MMOL/L — SIGNIFICANT CHANGE UP (ref 3.5–5.1)
POTASSIUM BLDV-SCNC: 4.5 MMOL/L — SIGNIFICANT CHANGE UP (ref 3.5–5.1)
POTASSIUM BLDV-SCNC: 5.1 MMOL/L — SIGNIFICANT CHANGE UP (ref 3.5–5.1)
POTASSIUM BLDV-SCNC: 5.1 MMOL/L — SIGNIFICANT CHANGE UP (ref 3.5–5.1)
POTASSIUM SERPL-MCNC: 4.4 MMOL/L — SIGNIFICANT CHANGE UP (ref 3.5–5.3)
POTASSIUM SERPL-SCNC: 4.4 MMOL/L — SIGNIFICANT CHANGE UP (ref 3.5–5.3)
PROT SERPL-MCNC: 5.4 G/DL — LOW (ref 6–8.3)
PROTHROM AB SERPL-ACNC: 13.7 SEC — HIGH (ref 10.6–13.6)
RBC # BLD: 3.88 M/UL — LOW (ref 4.2–5.8)
RBC # FLD: 14.1 % — SIGNIFICANT CHANGE UP (ref 10.3–14.5)
RH IG SCN BLD-IMP: POSITIVE — SIGNIFICANT CHANGE UP
SAO2 % BLDV: 85.4 % — SIGNIFICANT CHANGE UP (ref 67–88)
SAO2 % BLDV: 90 % — HIGH (ref 67–88)
SAO2 % BLDV: 92.2 % — HIGH (ref 67–88)
SAO2 % BLDV: 93 % — HIGH (ref 67–88)
SAO2 % BLDV: 97.8 % — HIGH (ref 67–88)
SODIUM SERPL-SCNC: 144 MMOL/L — SIGNIFICANT CHANGE UP (ref 135–145)
TROPONIN T, HIGH SENSITIVITY RESULT: 348 NG/L — HIGH (ref 0–51)
WBC # BLD: 8.48 K/UL — SIGNIFICANT CHANGE UP (ref 3.8–10.5)
WBC # FLD AUTO: 8.48 K/UL — SIGNIFICANT CHANGE UP (ref 3.8–10.5)

## 2022-02-16 PROCEDURE — 33533 CABG ARTERIAL SINGLE: CPT

## 2022-02-16 PROCEDURE — 33508 ENDOSCOPIC VEIN HARVEST: CPT | Mod: 59

## 2022-02-16 PROCEDURE — 33518 CABG ARTERY-VEIN TWO: CPT

## 2022-02-16 PROCEDURE — 71045 X-RAY EXAM CHEST 1 VIEW: CPT | Mod: 26

## 2022-02-16 PROCEDURE — 99291 CRITICAL CARE FIRST HOUR: CPT | Mod: 24

## 2022-02-16 PROCEDURE — 93010 ELECTROCARDIOGRAM REPORT: CPT

## 2022-02-16 DEVICE — CANNULA VENOUS 2 STAGE THIN FLEX 36/46FR X 1/2" WITH RETURN DIAL: Type: IMPLANTABLE DEVICE | Status: FUNCTIONAL

## 2022-02-16 DEVICE — CANNULA IMA 1MM BLUNT TIP: Type: IMPLANTABLE DEVICE | Status: FUNCTIONAL

## 2022-02-16 DEVICE — CANNULA VESSEL 3MM BLUNT TIP CLEAR 1-WAY VALVE: Type: IMPLANTABLE DEVICE | Status: FUNCTIONAL

## 2022-02-16 DEVICE — CHEST DRAIN THORACIC ARGYLE PVC 36FR STRAIGHT: Type: IMPLANTABLE DEVICE | Status: FUNCTIONAL

## 2022-02-16 DEVICE — IMPLANTABLE DEVICE: Type: IMPLANTABLE DEVICE | Status: FUNCTIONAL

## 2022-02-16 DEVICE — LIGATING CLIPS WECK HORIZON SMALL-WIDE (RED) 24: Type: IMPLANTABLE DEVICE | Status: FUNCTIONAL

## 2022-02-16 DEVICE — LIGATING CLIPS WECK HORIZON MEDIUM (BLUE) 24: Type: IMPLANTABLE DEVICE | Status: FUNCTIONAL

## 2022-02-16 DEVICE — KIT A-LINE 1LUM 20G X 12CM SAFE KIT: Type: IMPLANTABLE DEVICE | Status: FUNCTIONAL

## 2022-02-16 DEVICE — CANNULA AORTIC ROOT 14G X 14CM FLANGED: Type: IMPLANTABLE DEVICE | Status: FUNCTIONAL

## 2022-02-16 DEVICE — CHEST DRAIN THORACIC ARGYLE PVC 32FR RIGHT ANGLE: Type: IMPLANTABLE DEVICE | Status: FUNCTIONAL

## 2022-02-16 DEVICE — PACING WIRE BLUE DARK 2-0 24" SH SKS-3: Type: IMPLANTABLE DEVICE | Status: FUNCTIONAL

## 2022-02-16 DEVICE — KIT CVC 2LUM MAC 9FR CHG: Type: IMPLANTABLE DEVICE | Status: FUNCTIONAL

## 2022-02-16 DEVICE — CANNULA ARTERIAL STRAIGHT 20FR X 3/8" VENTED: Type: IMPLANTABLE DEVICE | Status: FUNCTIONAL

## 2022-02-16 RX ORDER — AMIODARONE HYDROCHLORIDE 400 MG/1
400 TABLET ORAL
Refills: 0 | Status: COMPLETED | OUTPATIENT
Start: 2022-02-16 | End: 2022-02-19

## 2022-02-16 RX ORDER — PANTOPRAZOLE SODIUM 20 MG/1
40 TABLET, DELAYED RELEASE ORAL DAILY
Refills: 0 | Status: DISCONTINUED | OUTPATIENT
Start: 2022-02-16 | End: 2022-02-17

## 2022-02-16 RX ORDER — ASCORBIC ACID 60 MG
500 TABLET,CHEWABLE ORAL
Refills: 0 | Status: DISCONTINUED | OUTPATIENT
Start: 2022-02-16 | End: 2022-02-20

## 2022-02-16 RX ORDER — CHLORHEXIDINE GLUCONATE 213 G/1000ML
1 SOLUTION TOPICAL ONCE
Refills: 0 | Status: DISCONTINUED | OUTPATIENT
Start: 2022-02-16 | End: 2022-02-16

## 2022-02-16 RX ORDER — GABAPENTIN 400 MG/1
300 CAPSULE ORAL ONCE
Refills: 0 | Status: COMPLETED | OUTPATIENT
Start: 2022-02-16 | End: 2022-02-16

## 2022-02-16 RX ORDER — ASPIRIN/CALCIUM CARB/MAGNESIUM 324 MG
81 TABLET ORAL DAILY
Refills: 0 | Status: DISCONTINUED | OUTPATIENT
Start: 2022-02-16 | End: 2022-02-20

## 2022-02-16 RX ORDER — POLYETHYLENE GLYCOL 3350 17 G/17G
17 POWDER, FOR SOLUTION ORAL DAILY
Refills: 0 | Status: DISCONTINUED | OUTPATIENT
Start: 2022-02-17 | End: 2022-02-20

## 2022-02-16 RX ORDER — HYDROMORPHONE HYDROCHLORIDE 2 MG/ML
0.5 INJECTION INTRAMUSCULAR; INTRAVENOUS; SUBCUTANEOUS ONCE
Refills: 0 | Status: DISCONTINUED | OUTPATIENT
Start: 2022-02-16 | End: 2022-02-16

## 2022-02-16 RX ORDER — HYDROMORPHONE HYDROCHLORIDE 2 MG/ML
0.5 INJECTION INTRAMUSCULAR; INTRAVENOUS; SUBCUTANEOUS EVERY 6 HOURS
Refills: 0 | Status: DISCONTINUED | OUTPATIENT
Start: 2022-02-16 | End: 2022-02-17

## 2022-02-16 RX ORDER — CHLORHEXIDINE GLUCONATE 213 G/1000ML
15 SOLUTION TOPICAL EVERY 12 HOURS
Refills: 0 | Status: DISCONTINUED | OUTPATIENT
Start: 2022-02-16 | End: 2022-02-16

## 2022-02-16 RX ORDER — DEXTROSE 50 % IN WATER 50 %
50 SYRINGE (ML) INTRAVENOUS
Refills: 0 | Status: DISCONTINUED | OUTPATIENT
Start: 2022-02-16 | End: 2022-02-16

## 2022-02-16 RX ORDER — OXYCODONE HYDROCHLORIDE 5 MG/1
10 TABLET ORAL EVERY 4 HOURS
Refills: 0 | Status: DISCONTINUED | OUTPATIENT
Start: 2022-02-16 | End: 2022-02-20

## 2022-02-16 RX ORDER — ACETAMINOPHEN 500 MG
1000 TABLET ORAL ONCE
Refills: 0 | Status: COMPLETED | OUTPATIENT
Start: 2022-02-16 | End: 2022-02-16

## 2022-02-16 RX ORDER — ACETAMINOPHEN 500 MG
650 TABLET ORAL EVERY 6 HOURS
Refills: 0 | Status: DISCONTINUED | OUTPATIENT
Start: 2022-02-19 | End: 2022-02-20

## 2022-02-16 RX ORDER — POTASSIUM CHLORIDE 20 MEQ
10 PACKET (EA) ORAL
Refills: 0 | Status: COMPLETED | OUTPATIENT
Start: 2022-02-16 | End: 2022-02-16

## 2022-02-16 RX ORDER — INSULIN GLARGINE 100 [IU]/ML
12 INJECTION, SOLUTION SUBCUTANEOUS
Qty: 0 | Refills: 0 | DISCHARGE

## 2022-02-16 RX ORDER — POTASSIUM CHLORIDE 20 MEQ
10 PACKET (EA) ORAL
Refills: 0 | Status: DISCONTINUED | OUTPATIENT
Start: 2022-02-16 | End: 2022-02-16

## 2022-02-16 RX ORDER — POLYETHYLENE GLYCOL 3350 17 G/17G
17 POWDER, FOR SOLUTION ORAL DAILY
Refills: 0 | Status: DISCONTINUED | OUTPATIENT
Start: 2022-02-16 | End: 2022-02-20

## 2022-02-16 RX ORDER — CHLORHEXIDINE GLUCONATE 213 G/1000ML
5 SOLUTION TOPICAL
Refills: 0 | Status: DISCONTINUED | OUTPATIENT
Start: 2022-02-16 | End: 2022-02-16

## 2022-02-16 RX ORDER — DEXTROSE 50 % IN WATER 50 %
25 SYRINGE (ML) INTRAVENOUS
Refills: 0 | Status: DISCONTINUED | OUTPATIENT
Start: 2022-02-16 | End: 2022-02-16

## 2022-02-16 RX ORDER — NICARDIPINE HYDROCHLORIDE 30 MG/1
5 CAPSULE, EXTENDED RELEASE ORAL
Qty: 40 | Refills: 0 | Status: DISCONTINUED | OUTPATIENT
Start: 2022-02-16 | End: 2022-02-17

## 2022-02-16 RX ORDER — CHLORHEXIDINE GLUCONATE 213 G/1000ML
1 SOLUTION TOPICAL DAILY
Refills: 0 | Status: DISCONTINUED | OUTPATIENT
Start: 2022-02-16 | End: 2022-02-20

## 2022-02-16 RX ORDER — GABAPENTIN 400 MG/1
100 CAPSULE ORAL EVERY 8 HOURS
Refills: 0 | Status: DISCONTINUED | OUTPATIENT
Start: 2022-02-16 | End: 2022-02-20

## 2022-02-16 RX ORDER — SODIUM CHLORIDE 9 MG/ML
1000 INJECTION INTRAMUSCULAR; INTRAVENOUS; SUBCUTANEOUS
Refills: 0 | Status: DISCONTINUED | OUTPATIENT
Start: 2022-02-16 | End: 2022-02-20

## 2022-02-16 RX ORDER — SENNA PLUS 8.6 MG/1
2 TABLET ORAL AT BEDTIME
Refills: 0 | Status: DISCONTINUED | OUTPATIENT
Start: 2022-02-17 | End: 2022-02-20

## 2022-02-16 RX ORDER — LIDOCAINE HCL 20 MG/ML
0.2 VIAL (ML) INJECTION ONCE
Refills: 0 | Status: DISCONTINUED | OUTPATIENT
Start: 2022-02-16 | End: 2022-02-16

## 2022-02-16 RX ORDER — ASPIRIN/CALCIUM CARB/MAGNESIUM 324 MG
300 TABLET ORAL ONCE
Refills: 0 | Status: DISCONTINUED | OUTPATIENT
Start: 2022-02-16 | End: 2022-02-16

## 2022-02-16 RX ORDER — INSULIN HUMAN 100 [IU]/ML
2 INJECTION, SOLUTION SUBCUTANEOUS
Qty: 100 | Refills: 0 | Status: DISCONTINUED | OUTPATIENT
Start: 2022-02-16 | End: 2022-02-18

## 2022-02-16 RX ORDER — ACETAMINOPHEN 500 MG
650 TABLET ORAL EVERY 6 HOURS
Refills: 0 | Status: COMPLETED | OUTPATIENT
Start: 2022-02-16 | End: 2022-02-19

## 2022-02-16 RX ORDER — CEFUROXIME AXETIL 250 MG
1500 TABLET ORAL EVERY 8 HOURS
Refills: 0 | Status: ACTIVE | OUTPATIENT
Start: 2022-02-16 | End: 2022-02-18

## 2022-02-16 RX ORDER — OXYCODONE HYDROCHLORIDE 5 MG/1
5 TABLET ORAL EVERY 4 HOURS
Refills: 0 | Status: DISCONTINUED | OUTPATIENT
Start: 2022-02-16 | End: 2022-02-20

## 2022-02-16 RX ORDER — MEPERIDINE HYDROCHLORIDE 50 MG/ML
25 INJECTION INTRAMUSCULAR; INTRAVENOUS; SUBCUTANEOUS ONCE
Refills: 0 | Status: DISCONTINUED | OUTPATIENT
Start: 2022-02-16 | End: 2022-02-16

## 2022-02-16 RX ORDER — SODIUM CHLORIDE 9 MG/ML
3 INJECTION INTRAMUSCULAR; INTRAVENOUS; SUBCUTANEOUS EVERY 8 HOURS
Refills: 0 | Status: DISCONTINUED | OUTPATIENT
Start: 2022-02-16 | End: 2022-02-16

## 2022-02-16 RX ORDER — DEXMEDETOMIDINE HYDROCHLORIDE IN 0.9% SODIUM CHLORIDE 4 UG/ML
0.5 INJECTION INTRAVENOUS
Qty: 200 | Refills: 0 | Status: DISCONTINUED | OUTPATIENT
Start: 2022-02-16 | End: 2022-02-16

## 2022-02-16 RX ADMIN — INSULIN HUMAN 3 UNIT(S)/HR: 100 INJECTION, SOLUTION SUBCUTANEOUS at 18:02

## 2022-02-16 RX ADMIN — GABAPENTIN 300 MILLIGRAM(S): 400 CAPSULE ORAL at 06:39

## 2022-02-16 RX ADMIN — HYDROMORPHONE HYDROCHLORIDE 0.5 MILLIGRAM(S): 2 INJECTION INTRAMUSCULAR; INTRAVENOUS; SUBCUTANEOUS at 22:22

## 2022-02-16 RX ADMIN — Medication 100 MILLIEQUIVALENT(S): at 21:30

## 2022-02-16 RX ADMIN — HYDROMORPHONE HYDROCHLORIDE 0.5 MILLIGRAM(S): 2 INJECTION INTRAMUSCULAR; INTRAVENOUS; SUBCUTANEOUS at 15:55

## 2022-02-16 RX ADMIN — Medication 1000 MILLIGRAM(S): at 07:00

## 2022-02-16 RX ADMIN — Medication 100 MILLIEQUIVALENT(S): at 22:08

## 2022-02-16 RX ADMIN — CHLORHEXIDINE GLUCONATE 1 APPLICATION(S): 213 SOLUTION TOPICAL at 20:09

## 2022-02-16 RX ADMIN — Medication 100 MILLIEQUIVALENT(S): at 14:44

## 2022-02-16 RX ADMIN — Medication 100 MILLIEQUIVALENT(S): at 16:53

## 2022-02-16 RX ADMIN — HYDROMORPHONE HYDROCHLORIDE 0.5 MILLIGRAM(S): 2 INJECTION INTRAMUSCULAR; INTRAVENOUS; SUBCUTANEOUS at 22:07

## 2022-02-16 RX ADMIN — Medication 100 MILLIEQUIVALENT(S): at 18:57

## 2022-02-16 RX ADMIN — HYDROMORPHONE HYDROCHLORIDE 0.5 MILLIGRAM(S): 2 INJECTION INTRAMUSCULAR; INTRAVENOUS; SUBCUTANEOUS at 15:25

## 2022-02-16 RX ADMIN — Medication 100 MILLIEQUIVALENT(S): at 15:40

## 2022-02-16 RX ADMIN — Medication 100 MILLIEQUIVALENT(S): at 23:09

## 2022-02-16 RX ADMIN — Medication 1000 MILLIGRAM(S): at 06:39

## 2022-02-16 RX ADMIN — PANTOPRAZOLE SODIUM 40 MILLIGRAM(S): 20 TABLET, DELAYED RELEASE ORAL at 18:01

## 2022-02-16 RX ADMIN — Medication 650 MILLIGRAM(S): at 19:34

## 2022-02-16 RX ADMIN — AMIODARONE HYDROCHLORIDE 400 MILLIGRAM(S): 400 TABLET ORAL at 19:03

## 2022-02-16 RX ADMIN — Medication 100 MILLIEQUIVALENT(S): at 16:21

## 2022-02-16 RX ADMIN — SODIUM CHLORIDE 10 MILLILITER(S): 9 INJECTION INTRAMUSCULAR; INTRAVENOUS; SUBCUTANEOUS at 18:03

## 2022-02-16 RX ADMIN — Medication 100 MILLIEQUIVALENT(S): at 19:47

## 2022-02-16 RX ADMIN — Medication 650 MILLIGRAM(S): at 19:04

## 2022-02-16 RX ADMIN — Medication 100 MILLIGRAM(S): at 16:22

## 2022-02-16 RX ADMIN — DEXMEDETOMIDINE HYDROCHLORIDE IN 0.9% SODIUM CHLORIDE 12.5 MICROGRAM(S)/KG/HR: 4 INJECTION INTRAVENOUS at 16:02

## 2022-02-16 RX ADMIN — Medication 500 MILLIGRAM(S): at 19:02

## 2022-02-16 RX ADMIN — Medication 81 MILLIGRAM(S): at 19:42

## 2022-02-16 RX ADMIN — GABAPENTIN 100 MILLIGRAM(S): 400 CAPSULE ORAL at 21:03

## 2022-02-16 NOTE — AIRWAY REMOVAL NOTE  ADULT & PEDS - ARTIFICAL AIRWAY REMOVAL COMMENTS
Written order for extubation verified. The patient was identified by full name and birth date compared to the identification band.  Present during the procedure was  the  RN

## 2022-02-16 NOTE — PATIENT PROFILE ADULT - FUNCTIONAL ASSESSMENT - BASIC MOBILITY 6.
Can she take Contrave with her Cymbalta.She has steady weight gain over the last 3-4 months. 4 = No assist / stand by assistance

## 2022-02-16 NOTE — PROGRESS NOTE ADULT - SUBJECTIVE AND OBJECTIVE BOX
BECKY ESTRADA  MRN-[id number]  Patient is a 61y old  Male who presents with a chief complaint of fatigue, CAD (14 Feb 2022 11:44)    HPI:  60 yo male presents to [facility] prior to scheduled CABG x 3 on 2/16/22 with Dr. Becky Chambers. Pmhx includes HTN, HLD, CAD, DM, ERNESTINE precautions, GERD, history of DVT in LLE (post op ankle surgery in 2019, was on Eliquis x 6 months and then cleared). Reports he feels "fatigued" and ALEJO after working; over the past few months. He denies chest pain, palpitations, SOB at rest, dizziness, syncope. Had a cardiac angiogram on 12/30/2021 which revealed " LT main 10 to 30% stenosis, proximal, proximal to mid LAD 80% stenosis, ostial 1st diagonal 50% stenosis, ostial and proximal circumflex 50% stenosis, proximal OM3 100% stenosis, proximal % stenosis, proximal to mid RCA 50% stenosis, distal RCA 70% stenosis, mid RPDA 70% stenosis." He was recently evaluated by Dr. Chambers and above surgery was recommended.    Patient denies previous Covid19 infection/exposure,recent travel,fevers,chills,cough,SOB,loss of sense of smell/taste.  Covid19 PCR performed at [facility] today. (14 Feb 2022 11:44)      Surgery/Hospital course:  2/16 C3L    Today/Overnight:  - Patient with history of coronary artery disease, subsequently underwent coronary artery bypass grafting procedure earlier today.   - Patient is on IV Nicardipine for history of hypertension   - Patient extubated, continue CPAP trials as tolerated.     Vital Signs Last 24 Hrs  T(C): 36.5 (16 Feb 2022 20:00), Max: 37.2 (16 Feb 2022 18:00)  T(F): 97.7 (16 Feb 2022 20:00), Max: 99 (16 Feb 2022 18:00)  HR: 94 (16 Feb 2022 21:00) (72 - 95)  BP: 152/89 (16 Feb 2022 05:51) (152/89 - 152/89)  BP(mean): --  RR: 24 (16 Feb 2022 21:00) (10 - 24)  SpO2: 99% (16 Feb 2022 21:00) (96% - 100%)  ============================I/O===========================  I&O's Detail    16 Feb 2022 07:01  -  16 Feb 2022 21:51  --------------------------------------------------------  IN:    Dexmedetomidine: 50.5 mL    Insulin: 24.5 mL    IV PiggyBack: 350 mL    Oral Fluid: 200 mL    sodium chloride 0.9%: 80 mL  Total IN: 705 mL    OUT:    Chest Tube (mL): 130 mL    Chest Tube (mL): 75 mL    Indwelling Catheter - Urethral (mL): 1100 mL    Nasogastric/Oral tube (mL): 0 mL    NiCARdipine: 0 mL  Total OUT: 1305 mL    Total NET: -600 mL        ============================ LABS =========================                        11.4   8.48  )-----------( 118      ( 16 Feb 2022 14:03 )             33.8     02-16    144  |  110<H>  |  20  ----------------------------<  206<H>  4.4   |  21<L>  |  0.76    Ca    9.0      16 Feb 2022 14:04  Phos  2.5     02-16  Mg     2.8     02-16    TPro  5.4<L>  /  Alb  4.2  /  TBili  0.7  /  DBili  x   /  AST  33  /  ALT  16  /  AlkPhos  26<L>  02-16    LIVER FUNCTIONS - ( 16 Feb 2022 14:04 )  Alb: 4.2 g/dL / Pro: 5.4 g/dL / ALK PHOS: 26 U/L / ALT: 16 U/L / AST: 33 U/L / GGT: x           PT/INR - ( 16 Feb 2022 14:03 )   PT: 13.7 sec;   INR: 1.15 ratio         PTT - ( 16 Feb 2022 14:03 )  PTT:26.1 sec  ABG - ( 16 Feb 2022 21:03 )  pH, Arterial: 7.40  pH, Blood: x     /  pCO2: 35    /  pO2: 97    / HCO3: 22    / Base Excess: -2.6  /  SaO2: 98.7                ======================Micro/Rad/Cardio=================  CXR: Reviewed  Echo: Reviewed  ======================================================  PAST MEDICAL & SURGICAL HISTORY:  Diabetes    HTN (hypertension)    HLD (hyperlipidemia)    CAD (coronary artery disease)    GERD (gastroesophageal reflux disease)    History of DVT in adulthood  2019, post op in LLE; treated with Eliquis x 6 months, now in ASA 81    H/O fracture of foot  screw and bone graft in left foot, 2019, complicated w/ DVT in LLE    H/O fracture of arm  age 10, LUE    History of tonsillectomy    H/O colonoscopy    S/P cataract extraction and insertion of intraocular lens      ========================ASSESSMENT ================  CAD w/ prior stents S/P CABG x3  HTN  Diabetes Mellitus   Acute blood loss anemia   Thrombocytopenia   Hyperchloremia   Obesity OHS / ERNESTINE     Plan:  ====================== NEUROLOGY=====================  Continue to monitor neuro status per ICU protocol.   Tylenol, Gabapentin, PRN Oxycodone, and PRN Dilaudid for pain management.     acetaminophen     Tablet .. 650 milliGRAM(s) Oral every 6 hours  gabapentin 100 milliGRAM(s) Oral every 8 hours  HYDROmorphone  Injectable 0.5 milliGRAM(s) IV Push every 6 hours PRN Severe Pain (7 - 10)  oxyCODONE    IR 5 milliGRAM(s) Oral every 4 hours PRN Moderate Pain (4 - 6)  oxyCODONE    IR 10 milliGRAM(s) Oral every 4 hours PRN Severe Pain (7 - 10)    ==================== RESPIRATORY======================  Patient with a history of obstructive sleep apnea initially on full ventilator support, subsequently weaned to pressure support and extubated while closely monitoring respiratory rate, breathing pattern, pulse ox monitoring, and intermittent blood gas analysis.   Increased concern for atelectasis due to obesity related restrictive lung disease.     Mechanical Ventilation:  Mode: CPAP with PS  FiO2: 40  PEEP: 5  PS: 5      ====================CARDIOVASCULAR==================  Patient with history of coronary artery disease with prior stents, subsequently underwent coronary artery bypass grafting x3 procedure earlier today.  Invasive hemodynamic monitoring, assess perfusion indices.   Intra-operative DEDE on 2/16 revealed EF of 55-60%, Normal left ventricular systolic function, Right ventricular enlargement with normal right ventricular systolic function, and Minimal tricuspid  regurgitation.   ASA continued for graft occlusion-thromboembolism prophylaxis.   Continue with Amiodarone for atrial fibrillation prophylaxis.   In addition requiring IV Nicardipine for history of Hypertension.   Temporary VVI 50 back up pacing wires in place.     aspirin enteric coated 81 milliGRAM(s) Oral daily  aMIOdarone    Tablet 400 milliGRAM(s) Oral two times a day  niCARdipine Infusion 5 mG/Hr (25 mL/Hr) IV Continuous <Continuous>    ===================HEMATOLOGIC/ONC ===================  Anemia and thrombocytopenia due to acute blood loss status post 1 unit of autologous packed red blood cells, no current plan for transfusion. Continue to monitor hemoglobin and hematocrit levels.     ===================== RENAL =========================  Patient with hyperchloremia, optimize renal perfusion with adequate volume resuscitation and continued monitoring of urine output, fluid balance, electrolytes, and BUN/Creatinine.     ==================== GASTROINTESTINAL===================  NPO after recent procedure, advance diet as tolerated. Continue Protonix for stress ulcer prophylaxis. Bowel regimen with Miralax.     ascorbic acid 500 milliGRAM(s) Oral two times a day  pantoprazole  Injectable 40 milliGRAM(s) IV Push daily  polyethylene glycol 3350 17 Gram(s) Oral daily  potassium chloride  10 mEq/50 mL IVPB 10 milliEquivalent(s) IV Intermittent every 1 hour  potassium chloride  10 mEq/50 mL IVPB 10 milliEquivalent(s) IV Intermittent every 1 hour  potassium chloride  10 mEq/50 mL IVPB 10 milliEquivalent(s) IV Intermittent every 1 hour  potassium chloride  10 mEq/50 mL IVPB 10 milliEquivalent(s) IV Intermittent every 1 hour  sodium chloride 0.9%. 1000 milliLiter(s) (10 mL/Hr) IV Continuous <Continuous>    =======================    ENDOCRINE  =====================  Metabolic stability, history of diabetes mellitus required an IV regular Insulin drip while following serial glucose levels to help achieve and maintain euglycemia.      dextrose 50% Injectable 50 milliLiter(s) IV Push every 15 minutes  dextrose 50% Injectable 25 milliLiter(s) IV Push every 15 minutes  insulin regular Infusion 3 Unit(s)/Hr (3 mL/Hr) IV Continuous <Continuous>    ========================INFECTIOUS DISEASE================  Afebrile, white blood cells within normal limits. Monitor temperature and trend white blood cells. Continue Cefuroxime for perioperative antibiotic coverage.     cefuroxime  IVPB 1500 milliGRAM(s) IV Intermittent every 8 hours      Patient requires continuous monitoring with bedside rhythm monitoring, pulse oximetry monitoring, and continuous central venous and arterial pressure monitoring; and intermittent blood gas analysis.  Care plan discussed with ICU care team.    Patient remained critical, at risk for life threatening decompensation.   I have spent 35 minutes providing acute care with multiple re-evaluations throughout the evening.     By signing my name below, I, Magnolia Mae, attest that this documentation has been prepared under the direction and in the presence of MYKE Nance.  Electronically signed: Kiran Ring, 02-16-22 @ 21:51    I, MYKE Nance, personally performed the services described in this documentation. All medical record entries made by the roibe were at my direction and in my presence. I have reviewed the chart and agree that the record reflects my personal performance and is accurate and complete  Electronically signed: MYKE Nance, 02-16-22 @ 21:51       BECKY ESTRADA  MRN-[id number]  Patient is a 61y old  Male who presents with a chief complaint of fatigue, CAD (14 Feb 2022 11:44)    HPI:  60 yo male presents to [facility] prior to scheduled CABG x 3 on 2/16/22 with Dr. Becky Chambers. Pmhx includes HTN, HLD, CAD, DM, ERNESTINE precautions, GERD, history of DVT in LLE (post op ankle surgery in 2019, was on Eliquis x 6 months and then cleared). Reports he feels "fatigued" and ALEJO after working; over the past few months. He denies chest pain, palpitations, SOB at rest, dizziness, syncope. Had a cardiac angiogram on 12/30/2021 which revealed " LT main 10 to 30% stenosis, proximal, proximal to mid LAD 80% stenosis, ostial 1st diagonal 50% stenosis, ostial and proximal circumflex 50% stenosis, proximal OM3 100% stenosis, proximal % stenosis, proximal to mid RCA 50% stenosis, distal RCA 70% stenosis, mid RPDA 70% stenosis." He was recently evaluated by Dr. Chambers and above surgery was recommended.    Patient denies previous Covid19 infection/exposure,recent travel,fevers,chills,cough,SOB,loss of sense of smell/taste.  Covid19 PCR performed at [facility] today. (14 Feb 2022 11:44)      Surgery/Hospital course:  2/16 C3L    Today/Overnight:  -extubated at 5:30pm  -pain control prn  -off pressors       Vital Signs Last 24 Hrs  T(C): 36.5 (16 Feb 2022 20:00), Max: 37.2 (16 Feb 2022 18:00)  T(F): 97.7 (16 Feb 2022 20:00), Max: 99 (16 Feb 2022 18:00)  HR: 94 (16 Feb 2022 21:00) (72 - 95)  BP: 152/89 (16 Feb 2022 05:51) (152/89 - 152/89)  BP(mean): --  RR: 24 (16 Feb 2022 21:00) (10 - 24)  SpO2: 99% (16 Feb 2022 21:00) (96% - 100%)  ============================I/O===========================  I&O's Detail    16 Feb 2022 07:01  -  16 Feb 2022 21:51  --------------------------------------------------------  IN:    Dexmedetomidine: 50.5 mL    Insulin: 24.5 mL    IV PiggyBack: 350 mL    Oral Fluid: 200 mL    sodium chloride 0.9%: 80 mL  Total IN: 705 mL    OUT:    Chest Tube (mL): 130 mL    Chest Tube (mL): 75 mL    Indwelling Catheter - Urethral (mL): 1100 mL    Nasogastric/Oral tube (mL): 0 mL    NiCARdipine: 0 mL  Total OUT: 1305 mL    Total NET: -600 mL        ============================ LABS =========================                        11.4   8.48  )-----------( 118      ( 16 Feb 2022 14:03 )             33.8     02-16    144  |  110<H>  |  20  ----------------------------<  206<H>  4.4   |  21<L>  |  0.76    Ca    9.0      16 Feb 2022 14:04  Phos  2.5     02-16  Mg     2.8     02-16    TPro  5.4<L>  /  Alb  4.2  /  TBili  0.7  /  DBili  x   /  AST  33  /  ALT  16  /  AlkPhos  26<L>  02-16    LIVER FUNCTIONS - ( 16 Feb 2022 14:04 )  Alb: 4.2 g/dL / Pro: 5.4 g/dL / ALK PHOS: 26 U/L / ALT: 16 U/L / AST: 33 U/L / GGT: x           PT/INR - ( 16 Feb 2022 14:03 )   PT: 13.7 sec;   INR: 1.15 ratio         PTT - ( 16 Feb 2022 14:03 )  PTT:26.1 sec  ABG - ( 16 Feb 2022 21:03 )  pH, Arterial: 7.40  pH, Blood: x     /  pCO2: 35    /  pO2: 97    / HCO3: 22    / Base Excess: -2.6  /  SaO2: 98.7                ======================Micro/Rad/Cardio=================  CXR: Reviewed  Echo: Reviewed  ======================================================  PAST MEDICAL & SURGICAL HISTORY:  Diabetes    HTN (hypertension)    HLD (hyperlipidemia)    CAD (coronary artery disease)    GERD (gastroesophageal reflux disease)    History of DVT in adulthood  2019, post op in LLE; treated with Eliquis x 6 months, now in ASA 82    H/O fracture of foot  screw and bone graft in left foot, 2019, complicated w/ DVT in LLE    H/O fracture of arm  age 10, LUE    History of tonsillectomy    H/O colonoscopy    S/P cataract extraction and insertion of intraocular lens      ========================ASSESSMENT ================  CAD w/ prior stents S/P CABG x3  HTN  Diabetes Mellitus   Acute blood loss anemia   Thrombocytopenia   Hyperchloremia   Obesity OHS / ERNESTINE     Plan:  ====================== NEUROLOGY=====================  Continue to monitor neuro status per ICU protocol.   Tylenol, Gabapentin, PRN Oxycodone, and PRN Dilaudid for pain management.     acetaminophen     Tablet .. 650 milliGRAM(s) Oral every 6 hours  gabapentin 100 milliGRAM(s) Oral every 8 hours  HYDROmorphone  Injectable 0.5 milliGRAM(s) IV Push every 6 hours PRN Severe Pain (7 - 10)  oxyCODONE    IR 5 milliGRAM(s) Oral every 4 hours PRN Moderate Pain (4 - 6)  oxyCODONE    IR 10 milliGRAM(s) Oral every 4 hours PRN Severe Pain (7 - 10)    ==================== RESPIRATORY======================  extubated at 5:30pm  sp02>92%  monitor chest tube outputs   AM cxr       ====================CARDIOVASCULAR==================  Patient with history of coronary artery disease with prior stents, subsequently underwent coronary artery bypass grafting x3 procedure earlier today.  Invasive hemodynamic monitoring, assess perfusion indices.   Intra-operative DEDE on 2/16 revealed EF of 55-60%, Normal left ventricular systolic function, Right ventricular enlargement with normal right ventricular systolic function, and Minimal tricuspid  regurgitation.   ASA continued for graft occlusion-thromboembolism prophylaxis.   Continue with Amiodarone for atrial fibrillation prophylaxis.   In addition requiring IV Nicardipine for history of Hypertension.   Temporary VVI 50 back up pacing wires in place.     aspirin enteric coated 81 milliGRAM(s) Oral daily  aMIOdarone    Tablet 400 milliGRAM(s) Oral two times a day  niCARdipine Infusion 5 mG/Hr (25 mL/Hr) IV Continuous <Continuous>    ===================HEMATOLOGIC/ONC ===================  Anemia and thrombocytopenia due to acute blood loss status post 1 unit of autologous packed red blood cells, no current plan for transfusion. Continue to monitor hemoglobin and hematocrit levels.     ===================== RENAL =========================  Patient with hyperchloremia, optimize renal perfusion with adequate volume resuscitation and continued monitoring of urine output, fluid balance, electrolytes, and BUN/Creatinine.     ==================== GASTROINTESTINAL===================  NPO after recent procedure, advance diet as tolerated. Continue Protonix for stress ulcer prophylaxis. Bowel regimen with Miralax.     ascorbic acid 500 milliGRAM(s) Oral two times a day  pantoprazole  Injectable 40 milliGRAM(s) IV Push daily  polyethylene glycol 3350 17 Gram(s) Oral daily  potassium chloride  10 mEq/50 mL IVPB 10 milliEquivalent(s) IV Intermittent every 1 hour  potassium chloride  10 mEq/50 mL IVPB 10 milliEquivalent(s) IV Intermittent every 1 hour  potassium chloride  10 mEq/50 mL IVPB 10 milliEquivalent(s) IV Intermittent every 1 hour  potassium chloride  10 mEq/50 mL IVPB 10 milliEquivalent(s) IV Intermittent every 1 hour  sodium chloride 0.9%. 1000 milliLiter(s) (10 mL/Hr) IV Continuous <Continuous>    =======================    ENDOCRINE  =====================  Metabolic stability, history of diabetes mellitus required an IV regular Insulin drip while following serial glucose levels to help achieve and maintain euglycemia.      dextrose 50% Injectable 50 milliLiter(s) IV Push every 15 minutes  dextrose 50% Injectable 25 milliLiter(s) IV Push every 15 minutes  insulin regular Infusion 3 Unit(s)/Hr (3 mL/Hr) IV Continuous <Continuous>    ========================INFECTIOUS DISEASE================  Afebrile, white blood cells within normal limits. Monitor temperature and trend white blood cells. Continue Cefuroxime for perioperative antibiotic coverage.     cefuroxime  IVPB 1500 milliGRAM(s) IV Intermittent every 8 hours      Patient requires continuous monitoring with bedside rhythm monitoring, pulse oximetry monitoring, and continuous central venous and arterial pressure monitoring; and intermittent blood gas analysis.  Care plan discussed with ICU care team.    Patient remained critical, at risk for life threatening decompensation.   I have spent 35 minutes providing acute care with multiple re-evaluations throughout the evening.     By signing my name below, I, Magnolia Mae, attest that this documentation has been prepared under the direction and in the presence of MYKE Nance.  Electronically signed: Kiran Ring, 02-16-22 @ 21:51    I, MYKE Nance, personally performed the services described in this documentation. All medical record entries made by the roibe were at my direction and in my presence. I have reviewed the chart and agree that the record reflects my personal performance and is accurate and complete  Electronically signed: MYKE Nance, 02-16-22 @ 21:51

## 2022-02-16 NOTE — BRIEF OPERATIVE NOTE - OPERATION/FINDINGS
CAD, CABGx3 (LIMA-LAD, SVG-Diag, SVG-LPL)  No blood products, patient received one unit of their own blood back at end of case  EF 60%

## 2022-02-16 NOTE — PRE-OP CHECKLIST - TEMPERATURE IN CELSIUS (DEGREES C)
Klarissa Brown  is here for a completion of her perioperative paperwork. she  Is scheduled to undergo 1.  Arthroscopic anterior cruciate ligament reconstruction, Right knee, hamstring autograft (7899)  2.  Arthroscopic chondroplasty, Right knee (41714)  3.  Arthroscopic partial synovectomy, Right knee (94017) on 10/10/2019.  She is a healthy individual and does not need clearance for this procedure.     Risks, indications and benefits of the surgical procedure were discussed with the patient. All questions with regard to surgery, rehab, expected return to functional activities, activities of daily living and recreational endeavors were answered to her satisfaction.    Patient was informed and understands the risks of surgery are greater for patients with a current condition or history of heart disease, obesity, clotting disorders, recurrent infections, steroid use, current or past smoking, and factors such as sedentary lifestyle and noncompliance with medications, therapy or follow-up. The degree of the increased risk is hard to estimate with any degree of precision.    Once no other questions were asked, a brief history and physical exam was then performed.    PAST MEDICAL HISTORY:   Past Medical History:   Diagnosis Date    HSV (herpes simplex virus) anogenital infection      PAST SURGICAL HISTORY:   Past Surgical History:   Procedure Laterality Date    HERNIA REPAIR      as 3 year old     FAMILY HISTORY:   Family History   Problem Relation Age of Onset    Sleep apnea Mother     Arthritis Mother         injuries from gymnastics; hypermobile joints    Anxiety disorder Mother     Depression Mother     Hypertension Father     Bipolar disorder Maternal Grandmother     Other Brother         hypomania but not bipolar d/o?    Cancer Neg Hx     Diabetes Neg Hx     Heart attack Neg Hx     Ovarian cancer Neg Hx     Breast cancer Neg Hx     Colon cancer Neg Hx      SOCIAL HISTORY:   Social History      Socioeconomic History    Marital status:      Spouse name: Not on file    Number of children: Not on file    Years of education: Not on file    Highest education level: Not on file   Occupational History    Not on file   Social Needs    Financial resource strain: Not on file    Food insecurity:     Worry: Not on file     Inability: Not on file    Transportation needs:     Medical: Not on file     Non-medical: Not on file   Tobacco Use    Smoking status: Never Smoker    Smokeless tobacco: Never Used   Substance and Sexual Activity    Alcohol use: Yes     Comment: 3-5 drinks/week    Drug use: No    Sexual activity: Yes     Partners: Male     Birth control/protection: None   Lifestyle    Physical activity:     Days per week: Not on file     Minutes per session: Not on file    Stress: Not on file   Relationships    Social connections:     Talks on phone: Not on file     Gets together: Not on file     Attends [employer] service: Not on file     Active member of club or organization: Not on file     Attends meetings of clubs or organizations: Not on file     Relationship status: Not on file   Other Topics Concern    Not on file   Social History Narrative    Not on file       MEDICATIONS:   Current Outpatient Medications:     acyclovir (ZOVIRAX) 400 MG tablet, TAKE 1 TABLET (400 MG TOTAL) BY MOUTH 4 (FOUR) TIMES DAILY. FOR 5 DAYS, Disp: , Rfl: 12    celecoxib (CELEBREX) 200 MG capsule, Take 1 capsule (200 mg total) by mouth 2 (two) times daily., Disp: 60 capsule, Rfl: 6  ALLERGIES: Review of patient's allergies indicates:  No Known Allergies    Review of Systems   Constitution: Negative. Negative for chills, fever and night sweats.   HENT: Negative for congestion and headaches.    Eyes: Negative for blurred vision, left vision loss and right vision loss.   Cardiovascular: Negative for chest pain and syncope.   Respiratory: Negative for cough and shortness of breath.    Endocrine: Negative for  polydipsia, polyphagia and polyuria.   Hematologic/Lymphatic: Negative for bleeding problem. Does not bruise/bleed easily.   Skin: Negative for dry skin, itching and rash.   Musculoskeletal: Negative for falls and muscle weakness.   Gastrointestinal: Negative for abdominal pain and bowel incontinence.   Genitourinary: Negative for bladder incontinence and nocturia.   Neurological: Negative for disturbances in coordination, loss of balance and seizures.   Psychiatric/Behavioral: Negative for depression. The patient does not have insomnia.    Allergic/Immunologic: Negative for hives and persistent infections.     PHYSICAL EXAM:  GEN: A&Ox3, WD WN NAD  HEENT: WNL  CHEST: CTAB, no W/R/R  HEART: RRR, no M/R/G   ABD: Soft, NT ND, BS x4 QUADS  MS: Refer to previous note for detailed MS exam  NEURO: CN II-XII intact       The surgical consent was then reviewed with the patient, who agreed with all the contents of the consent form and it was signed. she was then given the [facility] surgery packet to bring with her to [facility] for the anesthesia portion of her perioperative paperwork.     PHYSICAL THERAPY:  She was also instructed regarding physical therapy and will begin POD # 1-3.at [facility].    POST OP CARE:instructions were reviewed including care of the wound and dressing after surgery and when she can shower.     PAIN MANAGEMENT: Klarissa Brown was also given a pain management regime, which includes the TENS unit given to her by Artur Perez along with the education required for its use. She was also instructed regarding the Polar ice unit that will be in place after surgery and her postoperative pain medications.     PAIN MEDICATION:  Percocet 10/325mg 1 po q 4-6 hours prn pain  Phenergan 25 mg one p.o. q.4-6 hours p.r.n. nausea and vomiting.  Celebrex 200 mg BID x 6 weeks post op  ASA 352mg once a day x 6 weeks post op  TO BE DELIVERED POST-OP BEDSIDE    Patient denies history of seizures.     Patient was  instructed to buy and take:  Aspirin 325mg daily x 6 weeks for DVT prophylaxis starting on the evening after surgery.  Patient will also use bilateral TEDs on lower extremities, SCDs during surgery, and early ambulation post-op. If the patient was previously taking 81mg baby aspirin, they were told to not take it will using the above stated aspirin and to restart the 81mg aspirin after completion of the aspirin dose.       Patient was also told to buy over the counter Prilosec medication and take it once daily for GI protection as long as they are taking NSAIDs or Aspirin.    DVT prophylaxis was discussed with the patient today including risk factors for developing DVTs and history of DVTs. The patient was asked if any specific recommendations were given from the doctor/s that did pre-operative surgical clearance.      Patient was asked if they were taking or using OCP pills or devices. If they answered yes, then they were instructed to stop using OCPs at this pre-operative appointment until 2 months post-op to help prevent DVT development. They understand that there are other forms of birth control that do not involve hormones. They expressed understanding that ignoring/not following this instruction could result in a DVT which could turn into a deadly pulmonary embolism.      The patient was told that narcotic pain medications may make them drowsy and instructions were given to not sign legal documents, drive or operate heavy machinery, cars, or equipment while under the influence of narcotic medications.     As there were no other questions to be asked, she was given my business card along with Lázaro Cody MD business card if she has any questions or concerns prior to surgery or in the postop period.      37

## 2022-02-16 NOTE — PATIENT PROFILE ADULT - HEALTH LITERACY
Continue Regimen: Epiduo at night Detail Level: Detailed Otc Regimen: Discussed clarisonic brush or AHA/BHA Cleanser to exfoliate Plan: Encouraged removal of nose piercing no

## 2022-02-17 DIAGNOSIS — Z95.1 PRESENCE OF AORTOCORONARY BYPASS GRAFT: ICD-10-CM

## 2022-02-17 LAB
ALBUMIN SERPL ELPH-MCNC: 4.3 G/DL — SIGNIFICANT CHANGE UP (ref 3.3–5)
ALP SERPL-CCNC: 27 U/L — LOW (ref 40–120)
ALT FLD-CCNC: 18 U/L — SIGNIFICANT CHANGE UP (ref 10–45)
ANION GAP SERPL CALC-SCNC: 12 MMOL/L — SIGNIFICANT CHANGE UP (ref 5–17)
APTT BLD: 21.9 SEC — LOW (ref 27.5–35.5)
AST SERPL-CCNC: 36 U/L — SIGNIFICANT CHANGE UP (ref 10–40)
BILIRUB SERPL-MCNC: 0.5 MG/DL — SIGNIFICANT CHANGE UP (ref 0.2–1.2)
BUN SERPL-MCNC: 18 MG/DL — SIGNIFICANT CHANGE UP (ref 7–23)
CALCIUM SERPL-MCNC: 9 MG/DL — SIGNIFICANT CHANGE UP (ref 8.4–10.5)
CHLORIDE SERPL-SCNC: 109 MMOL/L — HIGH (ref 96–108)
CO2 SERPL-SCNC: 19 MMOL/L — LOW (ref 22–31)
CREAT SERPL-MCNC: 0.69 MG/DL — SIGNIFICANT CHANGE UP (ref 0.5–1.3)
GAS PNL BLDA: SIGNIFICANT CHANGE UP
GLUCOSE BLDC GLUCOMTR-MCNC: 130 MG/DL — HIGH (ref 70–99)
GLUCOSE BLDC GLUCOMTR-MCNC: 130 MG/DL — HIGH (ref 70–99)
GLUCOSE BLDC GLUCOMTR-MCNC: 131 MG/DL — HIGH (ref 70–99)
GLUCOSE BLDC GLUCOMTR-MCNC: 134 MG/DL — HIGH (ref 70–99)
GLUCOSE BLDC GLUCOMTR-MCNC: 135 MG/DL — HIGH (ref 70–99)
GLUCOSE BLDC GLUCOMTR-MCNC: 144 MG/DL — HIGH (ref 70–99)
GLUCOSE BLDC GLUCOMTR-MCNC: 155 MG/DL — HIGH (ref 70–99)
GLUCOSE BLDC GLUCOMTR-MCNC: 164 MG/DL — HIGH (ref 70–99)
GLUCOSE BLDC GLUCOMTR-MCNC: 170 MG/DL — HIGH (ref 70–99)
GLUCOSE BLDC GLUCOMTR-MCNC: 175 MG/DL — HIGH (ref 70–99)
GLUCOSE BLDC GLUCOMTR-MCNC: 178 MG/DL — HIGH (ref 70–99)
GLUCOSE BLDC GLUCOMTR-MCNC: 181 MG/DL — HIGH (ref 70–99)
GLUCOSE BLDC GLUCOMTR-MCNC: 193 MG/DL — HIGH (ref 70–99)
GLUCOSE BLDC GLUCOMTR-MCNC: 197 MG/DL — HIGH (ref 70–99)
GLUCOSE BLDC GLUCOMTR-MCNC: 200 MG/DL — HIGH (ref 70–99)
GLUCOSE BLDC GLUCOMTR-MCNC: 202 MG/DL — HIGH (ref 70–99)
GLUCOSE BLDC GLUCOMTR-MCNC: 204 MG/DL — HIGH (ref 70–99)
GLUCOSE BLDC GLUCOMTR-MCNC: 205 MG/DL — HIGH (ref 70–99)
GLUCOSE BLDC GLUCOMTR-MCNC: 207 MG/DL — HIGH (ref 70–99)
GLUCOSE BLDC GLUCOMTR-MCNC: 209 MG/DL — HIGH (ref 70–99)
GLUCOSE BLDC GLUCOMTR-MCNC: 229 MG/DL — HIGH (ref 70–99)
GLUCOSE SERPL-MCNC: 140 MG/DL — HIGH (ref 70–99)
HCT VFR BLD CALC: 34.7 % — LOW (ref 39–50)
HGB BLD-MCNC: 11.7 G/DL — LOW (ref 13–17)
INR BLD: 1.1 RATIO — SIGNIFICANT CHANGE UP (ref 0.88–1.16)
MAGNESIUM SERPL-MCNC: 2.2 MG/DL — SIGNIFICANT CHANGE UP (ref 1.6–2.6)
MCHC RBC-ENTMCNC: 28.8 PG — SIGNIFICANT CHANGE UP (ref 27–34)
MCHC RBC-ENTMCNC: 33.7 GM/DL — SIGNIFICANT CHANGE UP (ref 32–36)
MCV RBC AUTO: 85.5 FL — SIGNIFICANT CHANGE UP (ref 80–100)
NRBC # BLD: 0 /100 WBCS — SIGNIFICANT CHANGE UP (ref 0–0)
PHOSPHATE SERPL-MCNC: 3.3 MG/DL — SIGNIFICANT CHANGE UP (ref 2.5–4.5)
PLATELET # BLD AUTO: 139 K/UL — LOW (ref 150–400)
POTASSIUM SERPL-MCNC: 4.4 MMOL/L — SIGNIFICANT CHANGE UP (ref 3.5–5.3)
POTASSIUM SERPL-SCNC: 4.4 MMOL/L — SIGNIFICANT CHANGE UP (ref 3.5–5.3)
PROT SERPL-MCNC: 5.9 G/DL — LOW (ref 6–8.3)
PROTHROM AB SERPL-ACNC: 13.1 SEC — SIGNIFICANT CHANGE UP (ref 10.6–13.6)
RBC # BLD: 4.06 M/UL — LOW (ref 4.2–5.8)
RBC # FLD: 14 % — SIGNIFICANT CHANGE UP (ref 10.3–14.5)
SODIUM SERPL-SCNC: 140 MMOL/L — SIGNIFICANT CHANGE UP (ref 135–145)
WBC # BLD: 10 K/UL — SIGNIFICANT CHANGE UP (ref 3.8–10.5)
WBC # FLD AUTO: 10 K/UL — SIGNIFICANT CHANGE UP (ref 3.8–10.5)

## 2022-02-17 PROCEDURE — 71045 X-RAY EXAM CHEST 1 VIEW: CPT | Mod: 26

## 2022-02-17 PROCEDURE — 99291 CRITICAL CARE FIRST HOUR: CPT

## 2022-02-17 PROCEDURE — 93010 ELECTROCARDIOGRAM REPORT: CPT

## 2022-02-17 RX ORDER — HYDROMORPHONE HYDROCHLORIDE 2 MG/ML
1 INJECTION INTRAMUSCULAR; INTRAVENOUS; SUBCUTANEOUS ONCE
Refills: 0 | Status: DISCONTINUED | OUTPATIENT
Start: 2022-02-17 | End: 2022-02-17

## 2022-02-17 RX ORDER — SIMVASTATIN 20 MG/1
40 TABLET, FILM COATED ORAL AT BEDTIME
Refills: 0 | Status: DISCONTINUED | OUTPATIENT
Start: 2022-02-17 | End: 2022-02-20

## 2022-02-17 RX ORDER — METOPROLOL TARTRATE 50 MG
25 TABLET ORAL EVERY 8 HOURS
Refills: 0 | Status: DISCONTINUED | OUTPATIENT
Start: 2022-02-17 | End: 2022-02-19

## 2022-02-17 RX ORDER — INSULIN LISPRO 100/ML
VIAL (ML) SUBCUTANEOUS
Refills: 0 | Status: DISCONTINUED | OUTPATIENT
Start: 2022-02-17 | End: 2022-02-18

## 2022-02-17 RX ORDER — METOPROLOL TARTRATE 50 MG
25 TABLET ORAL
Refills: 0 | Status: DISCONTINUED | OUTPATIENT
Start: 2022-02-17 | End: 2022-02-17

## 2022-02-17 RX ORDER — INSULIN GLARGINE 100 [IU]/ML
15 INJECTION, SOLUTION SUBCUTANEOUS AT BEDTIME
Refills: 0 | Status: DISCONTINUED | OUTPATIENT
Start: 2022-02-17 | End: 2022-02-18

## 2022-02-17 RX ORDER — FENOFIBRATE,MICRONIZED 130 MG
145 CAPSULE ORAL DAILY
Refills: 0 | Status: DISCONTINUED | OUTPATIENT
Start: 2022-02-17 | End: 2022-02-20

## 2022-02-17 RX ORDER — METOCLOPRAMIDE HCL 10 MG
10 TABLET ORAL EVERY 8 HOURS
Refills: 0 | Status: COMPLETED | OUTPATIENT
Start: 2022-02-17 | End: 2022-02-17

## 2022-02-17 RX ORDER — INSULIN LISPRO 100/ML
5 VIAL (ML) SUBCUTANEOUS
Refills: 0 | Status: DISCONTINUED | OUTPATIENT
Start: 2022-02-17 | End: 2022-02-18

## 2022-02-17 RX ORDER — ENOXAPARIN SODIUM 100 MG/ML
40 INJECTION SUBCUTANEOUS DAILY
Refills: 0 | Status: DISCONTINUED | OUTPATIENT
Start: 2022-02-17 | End: 2022-02-20

## 2022-02-17 RX ORDER — ATORVASTATIN CALCIUM 80 MG/1
80 TABLET, FILM COATED ORAL AT BEDTIME
Refills: 0 | Status: DISCONTINUED | OUTPATIENT
Start: 2022-02-17 | End: 2022-02-17

## 2022-02-17 RX ORDER — HYDROMORPHONE HYDROCHLORIDE 2 MG/ML
0.5 INJECTION INTRAMUSCULAR; INTRAVENOUS; SUBCUTANEOUS ONCE
Refills: 0 | Status: DISCONTINUED | OUTPATIENT
Start: 2022-02-17 | End: 2022-02-17

## 2022-02-17 RX ADMIN — Medication 100 MILLIGRAM(S): at 16:40

## 2022-02-17 RX ADMIN — Medication 650 MILLIGRAM(S): at 11:44

## 2022-02-17 RX ADMIN — Medication 650 MILLIGRAM(S): at 00:02

## 2022-02-17 RX ADMIN — SENNA PLUS 2 TABLET(S): 8.6 TABLET ORAL at 22:21

## 2022-02-17 RX ADMIN — HYDROMORPHONE HYDROCHLORIDE 0.5 MILLIGRAM(S): 2 INJECTION INTRAMUSCULAR; INTRAVENOUS; SUBCUTANEOUS at 04:05

## 2022-02-17 RX ADMIN — OXYCODONE HYDROCHLORIDE 10 MILLIGRAM(S): 5 TABLET ORAL at 21:40

## 2022-02-17 RX ADMIN — Medication 500 MILLIGRAM(S): at 05:38

## 2022-02-17 RX ADMIN — HYDROMORPHONE HYDROCHLORIDE 0.5 MILLIGRAM(S): 2 INJECTION INTRAMUSCULAR; INTRAVENOUS; SUBCUTANEOUS at 00:56

## 2022-02-17 RX ADMIN — Medication 145 MILLIGRAM(S): at 11:43

## 2022-02-17 RX ADMIN — ENOXAPARIN SODIUM 40 MILLIGRAM(S): 100 INJECTION SUBCUTANEOUS at 11:43

## 2022-02-17 RX ADMIN — Medication 650 MILLIGRAM(S): at 00:32

## 2022-02-17 RX ADMIN — Medication 10: at 11:45

## 2022-02-17 RX ADMIN — Medication 100 MILLIGRAM(S): at 00:02

## 2022-02-17 RX ADMIN — Medication 650 MILLIGRAM(S): at 12:15

## 2022-02-17 RX ADMIN — Medication 650 MILLIGRAM(S): at 05:38

## 2022-02-17 RX ADMIN — GABAPENTIN 100 MILLIGRAM(S): 400 CAPSULE ORAL at 14:07

## 2022-02-17 RX ADMIN — HYDROMORPHONE HYDROCHLORIDE 0.5 MILLIGRAM(S): 2 INJECTION INTRAMUSCULAR; INTRAVENOUS; SUBCUTANEOUS at 04:20

## 2022-02-17 RX ADMIN — Medication 650 MILLIGRAM(S): at 17:49

## 2022-02-17 RX ADMIN — HYDROMORPHONE HYDROCHLORIDE 0.5 MILLIGRAM(S): 2 INJECTION INTRAMUSCULAR; INTRAVENOUS; SUBCUTANEOUS at 00:42

## 2022-02-17 RX ADMIN — Medication 500 MILLIGRAM(S): at 17:49

## 2022-02-17 RX ADMIN — INSULIN GLARGINE 15 UNIT(S): 100 INJECTION, SOLUTION SUBCUTANEOUS at 22:22

## 2022-02-17 RX ADMIN — GABAPENTIN 100 MILLIGRAM(S): 400 CAPSULE ORAL at 22:21

## 2022-02-17 RX ADMIN — Medication 10 MILLIGRAM(S): at 22:22

## 2022-02-17 RX ADMIN — HYDROMORPHONE HYDROCHLORIDE 0.5 MILLIGRAM(S): 2 INJECTION INTRAMUSCULAR; INTRAVENOUS; SUBCUTANEOUS at 09:33

## 2022-02-17 RX ADMIN — CHLORHEXIDINE GLUCONATE 1 APPLICATION(S): 213 SOLUTION TOPICAL at 22:09

## 2022-02-17 RX ADMIN — Medication 81 MILLIGRAM(S): at 11:44

## 2022-02-17 RX ADMIN — Medication 650 MILLIGRAM(S): at 17:59

## 2022-02-17 RX ADMIN — HYDROMORPHONE HYDROCHLORIDE 1 MILLIGRAM(S): 2 INJECTION INTRAMUSCULAR; INTRAVENOUS; SUBCUTANEOUS at 05:42

## 2022-02-17 RX ADMIN — Medication 25 MILLIGRAM(S): at 22:21

## 2022-02-17 RX ADMIN — Medication 100 MILLIGRAM(S): at 07:54

## 2022-02-17 RX ADMIN — GABAPENTIN 100 MILLIGRAM(S): 400 CAPSULE ORAL at 05:38

## 2022-02-17 RX ADMIN — Medication 5 UNIT(S): at 11:46

## 2022-02-17 RX ADMIN — HYDROMORPHONE HYDROCHLORIDE 1 MILLIGRAM(S): 2 INJECTION INTRAMUSCULAR; INTRAVENOUS; SUBCUTANEOUS at 06:00

## 2022-02-17 RX ADMIN — AMIODARONE HYDROCHLORIDE 400 MILLIGRAM(S): 400 TABLET ORAL at 05:38

## 2022-02-17 RX ADMIN — SIMVASTATIN 40 MILLIGRAM(S): 20 TABLET, FILM COATED ORAL at 22:22

## 2022-02-17 RX ADMIN — Medication 650 MILLIGRAM(S): at 06:08

## 2022-02-17 RX ADMIN — Medication 10 MILLIGRAM(S): at 04:44

## 2022-02-17 RX ADMIN — AMIODARONE HYDROCHLORIDE 400 MILLIGRAM(S): 400 TABLET ORAL at 17:49

## 2022-02-17 RX ADMIN — OXYCODONE HYDROCHLORIDE 10 MILLIGRAM(S): 5 TABLET ORAL at 14:37

## 2022-02-17 RX ADMIN — OXYCODONE HYDROCHLORIDE 10 MILLIGRAM(S): 5 TABLET ORAL at 20:38

## 2022-02-17 RX ADMIN — Medication 25 MILLIGRAM(S): at 05:38

## 2022-02-17 RX ADMIN — HYDROMORPHONE HYDROCHLORIDE 0.5 MILLIGRAM(S): 2 INJECTION INTRAMUSCULAR; INTRAVENOUS; SUBCUTANEOUS at 09:50

## 2022-02-17 RX ADMIN — Medication 10 MILLIGRAM(S): at 14:07

## 2022-02-17 RX ADMIN — OXYCODONE HYDROCHLORIDE 10 MILLIGRAM(S): 5 TABLET ORAL at 14:32

## 2022-02-17 RX ADMIN — POLYETHYLENE GLYCOL 3350 17 GRAM(S): 17 POWDER, FOR SOLUTION ORAL at 11:44

## 2022-02-17 NOTE — PROGRESS NOTE ADULT - SUBJECTIVE AND OBJECTIVE BOX
VITAL SIGNS    Telemetry:      Vital Signs Last 24 Hrs  T(C): 36.9 (22 @ 12:40), Max: 37.3 (22 @ 04:00)  T(F): 98.4 (22 @ 12:40), Max: 99.1 (22 @ 04:00)  HR: 88 (22 @ 12:40) (84 - 97)  BP: --  RR: 18 (22 @ 12:40) (10 - 27)  SpO2: 98% (22 @ 12:40) (95% - 100%)                    07:01  -   @ 07:00  --------------------------------------------------------  IN: 1042 mL / OUT: 2065 mL / NET: -1023 mL     @ 07:01  -   @ 14:40  --------------------------------------------------------  IN: 620.5 mL / OUT: 435 mL / NET: 185.5 mL          Daily     Daily Weight in k.8 (2022 00:00)            CAPILLARY BLOOD GLUCOSE  155 (2022 07:00)  164 (2022 06:00)  134 (2022 05:00)  135 (2022 04:00)  130 (2022 03:00)  131 (2022 02:00)  130 (2022 01:00)  131 (2022 00:00)  135 (2022 23:00)  144 (2022 22:00)  149 (2022 21:00)  149 (2022 20:00)  145 (2022 19:00)  156 (2022 18:15)  171 (2022 17:00)  176 (2022 16:00)  174 (2022 15:00)      POCT Blood Glucose.: 229 mg/dL (2022 13:58)  POCT Blood Glucose.: 207 mg/dL (2022 12:40)  POCT Blood Glucose.: 204 mg/dL (2022 11:42)  POCT Blood Glucose.: 205 mg/dL (2022 11:03)  POCT Blood Glucose.: 197 mg/dL (2022 09:53)  POCT Blood Glucose.: 175 mg/dL (2022 09:01)  POCT Blood Glucose.: 144 mg/dL (2022 07:51)  POCT Blood Glucose.: 155 mg/dL (2022 06:44)  POCT Blood Glucose.: 164 mg/dL (2022 06:00)  POCT Blood Glucose.: 134 mg/dL (2022 04:56)  POCT Blood Glucose.: 135 mg/dL (2022 03:58)  POCT Blood Glucose.: 130 mg/dL (2022 02:56)  POCT Blood Glucose.: 131 mg/dL (2022 01:59)  POCT Blood Glucose.: 130 mg/dL (2022 01:00)  POCT Blood Glucose.: 131 mg/dL (2022 23:56)  POCT Blood Glucose.: 135 mg/dL (2022 23:06)  POCT Blood Glucose.: 144 mg/dL (2022 22:14)  POCT Blood Glucose.: 149 mg/dL (2022 20:57)  POCT Blood Glucose.: 149 mg/dL (2022 19:56)  POCT Blood Glucose.: 145 mg/dL (2022 19:04)  POCT Blood Glucose.: 156 mg/dL (2022 18:21)  POCT Blood Glucose.: 171 mg/dL (2022 17:08)  POCT Blood Glucose.: 176 mg/dL (2022 16:10)  POCT Blood Glucose.: 174 mg/dL (2022 15:10)            Drains:     MS         [  ] Drainage:                 L Pleural  [  ]  Drainage:                R Pleural  [  ]  Drainage:    Pacing Wires        [  ]   Settings:                                  Isolated  [  ]    Coumadin    [ ] YES          [  ]      NO                                   PHYSICAL EXAM        Neurology: alert and oriented x 3, nonfocal, no gross deficits  CV : s1 s2 RRR  Sternal Wound :  CDI , Stable  Lungs: cta  Abdomen: soft, nontender, nondistended, positive bowel sounds, last bowel movement                       chest tubes  :    voiding / montgomery - sbd         Extremities:      edema   /  -   calve tenderness ,    L leg  /  R leg  incisions cdi          acetaminophen     Tablet .. 650 milliGRAM(s) Oral every 6 hours  aMIOdarone    Tablet 400 milliGRAM(s) Oral two times a day  ascorbic acid 500 milliGRAM(s) Oral two times a day  aspirin enteric coated 81 milliGRAM(s) Oral daily  cefuroxime  IVPB 1500 milliGRAM(s) IV Intermittent every 8 hours  chlorhexidine 2% Cloths 1 Application(s) Topical daily  enoxaparin Injectable 40 milliGRAM(s) SubCutaneous daily  fenofibrate Tablet 145 milliGRAM(s) Oral daily  gabapentin 100 milliGRAM(s) Oral every 8 hours  HYDROmorphone  Injectable 0.5 milliGRAM(s) IV Push every 6 hours PRN  insulin glargine Injectable (LANTUS) 15 Unit(s) SubCutaneous at bedtime  insulin lispro (ADMELOG) corrective regimen sliding scale   SubCutaneous three times a day before meals  insulin lispro Injectable (ADMELOG) 5 Unit(s) SubCutaneous before breakfast  insulin lispro Injectable (ADMELOG) 5 Unit(s) SubCutaneous before lunch  insulin lispro Injectable (ADMELOG) 5 Unit(s) SubCutaneous before dinner  insulin regular Infusion 2 Unit(s)/Hr IV Continuous <Continuous>  metoclopramide Injectable 10 milliGRAM(s) IV Push every 8 hours  metoprolol tartrate 25 milliGRAM(s) Oral two times a day  oxyCODONE    IR 5 milliGRAM(s) Oral every 4 hours PRN  oxyCODONE    IR 10 milliGRAM(s) Oral every 4 hours PRN  polyethylene glycol 3350 17 Gram(s) Oral daily  polyethylene glycol 3350 17 Gram(s) Oral daily  senna 2 Tablet(s) Oral at bedtime  simvastatin 40 milliGRAM(s) Oral at bedtime  sodium chloride 0.9%. 1000 milliLiter(s) IV Continuous <Continuous>                    Physical Therapy Rec:   Home  [  ]   Home w/ PT  [  ]  Rehab  [  ]  Discussed with Cardiothoracic Team at AM rounds.     VITAL SIGNS    Telemetry:  nsr 86    Vital Signs Last 24 Hrs  T(C): 36.9 (22 @ 12:40), Max: 37.3 (22 @ 04:00)  T(F): 98.4 (22 @ 12:40), Max: 99.1 (22 @ 04:00)  HR: 88 (22 @ 12:40) (84 - 97)  BP: --119/65  RR: 18 (22 @ 12:40) (10 - 27)  SpO2: 98% (22 @ 12:40) (95% - 100%)                    @ 07:01  -   @ 07:00  --------------------------------------------------------  IN: 1042 mL / OUT: 2065 mL / NET: -1023 mL     @ 07:01  -   @ 14:40  --------------------------------------------------------  IN: 620.5 mL / OUT: 435 mL / NET: 185.5 mL          Daily     Daily Weight in k.8 (2022 00:00)            CAPILLARY BLOOD GLUCOSE  155 (2022 07:00)  164 (2022 06:00)  134 (2022 05:00)  135 (2022 04:00)  130 (2022 03:00)  131 (2022 02:00)  130 (2022 01:00)  131 (2022 00:00)  135 (2022 23:00)  144 (2022 22:00)  149 (2022 21:00)  149 (2022 20:00)  145 (2022 19:00)  156 (2022 18:15)  171 (2022 17:00)  176 (2022 16:00)  174 (2022 15:00)      POCT Blood Glucose.: 229 mg/dL (2022 13:58)  POCT Blood Glucose.: 207 mg/dL (2022 12:40)  POCT Blood Glucose.: 204 mg/dL (2022 11:42)  POCT Blood Glucose.: 205 mg/dL (2022 11:03)  POCT Blood Glucose.: 197 mg/dL (2022 09:53)  POCT Blood Glucose.: 175 mg/dL (2022 09:01)  POCT Blood Glucose.: 144 mg/dL (2022 07:51)  POCT Blood Glucose.: 155 mg/dL (2022 06:44)  POCT Blood Glucose.: 164 mg/dL (2022 06:00)  POCT Blood Glucose.: 134 mg/dL (2022 04:56)  POCT Blood Glucose.: 135 mg/dL (2022 03:58)  POCT Blood Glucose.: 130 mg/dL (2022 02:56)  POCT Blood Glucose.: 131 mg/dL (2022 01:59)  POCT Blood Glucose.: 130 mg/dL (2022 01:00)  POCT Blood Glucose.: 131 mg/dL (2022 23:56)  POCT Blood Glucose.: 135 mg/dL (2022 23:06)  POCT Blood Glucose.: 144 mg/dL (2022 22:14)  POCT Blood Glucose.: 149 mg/dL (2022 20:57)  POCT Blood Glucose.: 149 mg/dL (2022 19:56)  POCT Blood Glucose.: 145 mg/dL (2022 19:04)  POCT Blood Glucose.: 156 mg/dL (2022 18:21)  POCT Blood Glucose.: 171 mg/dL (2022 17:08)  POCT Blood Glucose.: 176 mg/dL (2022 16:10)  POCT Blood Glucose.: 174 mg/dL (2022 15:10)            Drains:     MS         [ x ] Drainage:                 L Pleural  [ x ]  Drainage:                R Pleural  [  ]  Drainage:    Pacing Wires        x[  ]   Settings:                                  Isolated  [  ]    Coumadin    [ ] YES          [  x]      NO                                   PHYSICAL EXAM        Neurology: alert and oriented x 3, nonfocal, no gross deficits  CV : s1 s2 RRR  r ij  l rad barbara  Sternal Wound :  CDI , Stable  Lungs: cta  Abdomen: soft, nontender, nondistended, positive bowel sounds, last bowel movement                       chest tubes x2  :    mont[place] - sbd         Extremities:     - edema   /  -   calve tenderness ,    L leg    incisions dressing cdi          acetaminophen     Tablet .. 650 milliGRAM(s) Oral every 6 hours  aMIOdarone    Tablet 400 milliGRAM(s) Oral two times a day  ascorbic acid 500 milliGRAM(s) Oral two times a day  aspirin enteric coated 81 milliGRAM(s) Oral daily  cefuroxime  IVPB 1500 milliGRAM(s) IV Intermittent every 8 hours  chlorhexidine 2% Cloths 1 Application(s) Topical daily  enoxaparin Injectable 40 milliGRAM(s) SubCutaneous daily  fenofibrate Tablet 145 milliGRAM(s) Oral daily  gabapentin 100 milliGRAM(s) Oral every 8 hours  HYDROmorphone  Injectable 0.5 milliGRAM(s) IV Push every 6 hours PRN  insulin glargine Injectable (LANTUS) 15 Unit(s) SubCutaneous at bedtime  insulin lispro (ADMELOG) corrective regimen sliding scale   SubCutaneous three times a day before meals  insulin lispro Injectable (ADMELOG) 5 Unit(s) SubCutaneous before breakfast  insulin lispro Injectable (ADMELOG) 5 Unit(s) SubCutaneous before lunch  insulin lispro Injectable (ADMELOG) 5 Unit(s) SubCutaneous before dinner  insulin regular Infusion 2 Unit(s)/Hr IV Continuous <Continuous>  metoclopramide Injectable 10 milliGRAM(s) IV Push every 8 hours  metoprolol tartrate 25 milliGRAM(s) Oral two times a day  oxyCODONE    IR 5 milliGRAM(s) Oral every 4 hours PRN  oxyCODONE    IR 10 milliGRAM(s) Oral every 4 hours PRN  polyethylene glycol 3350 17 Gram(s) Oral daily  polyethylene glycol 3350 17 Gram(s) Oral daily  senna 2 Tablet(s) Oral at bedtime  simvastatin 40 milliGRAM(s) Oral at bedtime  sodium chloride 0.9%. 1000 milliLiter(s) IV Continuous <Continuous>                    Physical Therapy Rec:   Home  [  ]   Home w/ PT  [  ]  Rehab  [  ]  Discussed with Cardiothoracic Team at AM rounds.

## 2022-02-17 NOTE — PHYSICAL THERAPY INITIAL EVALUATION ADULT - STRENGTHENING, PT EVAL
1. Please continue Terazosin 5 mg daily.      2. Please sildenafil (generic Viagra) 20 mg tablet, 4--5 tablets for a total dose of 80--100 mg 1-2 hours before planned sexual activity; do not take within 4 hours of taking Terazosin.     3.  Return visit in o GOAL: Pt will improve BUE/BLE strength by 1/2 grade in 2wks to improve overall functional mobility

## 2022-02-17 NOTE — PROGRESS NOTE ADULT - ASSESSMENT
60 yo male presents to PST prior to scheduled CABG x 3 on 2/16/22 with Dr. Moi Chambers. Pmhx includes HTN, HLD, CAD, DM, ERNESTINE precautions, GERD, history of DVT in LLE (post op ankle surgery in 2019, was on Eliquis x 6 months and then cleared). Reports he feels "fatigued" and ALEJO after working; over the past few months. He denies chest pain, palpitations, SOB at rest, dizziness, syncope. Had a cardiac angiogram on 12/30/2021 which revealed " LT main 10 to 30% stenosis, proximal, proximal to mid LAD 80% stenosis, ostial 1st diagonal 50% stenosis, ostial and proximal circumflex 50% stenosis, proximal OM3 100% stenosis, proximal % stenosis, proximal to mid RCA 50% stenosis, distal RCA 70% stenosis, mid RPDA 70% stenosis." He was recently evaluated by Dr. Chambers and above surgery was recommended.  2/16/22/p CABGx3 (LIMA-LAD, SVG-Diag, SVG-LPL) ef 60  No blood products, patient received one unit of their own blood back at end of case  Extubated d 0  Insulin infusion  2/17 transferred to sdu

## 2022-02-17 NOTE — PROGRESS NOTE ADULT - SUBJECTIVE AND OBJECTIVE BOX
Patient seen and examined at the bedside.    Remained critically ill on continuous ICU monitoring.    OBJECTIVE:  Vital Signs Last 24 Hrs  T(C): 37.3 (17 Feb 2022 04:00), Max: 37.3 (17 Feb 2022 04:00)  T(F): 99.1 (17 Feb 2022 04:00), Max: 99.1 (17 Feb 2022 04:00)  HR: 97 (17 Feb 2022 06:00) (75 - 97)  BP: --  BP(mean): --  RR: 16 (17 Feb 2022 06:00) (10 - 24)  SpO2: 96% (17 Feb 2022 06:00) (96% - 100%)      Physical Exam:   General: NAD    Neurology: nonfocal   Eyes: bilateral pupils equal and reactive   ENT/Neck:  Neck supple, trachea midline, No JVD   Respiratory: Clear bilaterally   CV: S1S2, no murmurs        [x] Sternal dressing, [x] Mediastinal CT, [x] Pleural CT          [x] Sinus rhythm [x] Temporary pacing   Abdominal: Soft, NT, ND +BS,   Extremities: 1-2+ pedal edema noted, + peripheral pulses   Skin: No Rashes, Hematoma, Ecchymosis                           Assessment:  60 yo male presents to PST prior to scheduled CABG x 3 on 2/16/22 with Dr. Moi Chambers. Pmhx includes HTN, HLD, CAD, DM, ERNESTINE precautions, GERD, history of DVT in LLE (post op ankle surgery in 2019, was on Eliquis x 6 months and then cleared)    CAD s/p C3L POD 2/16   Hypovolemic Shock   Post op respiratory insufficiency   Acute blood loss anemia   Thrombocytopenia   Diabetes mellitus     Plan:   ***Neuro***  [x] Nonfocal     Post operative neuro assessment     ***Cardiovascular***  Invasive hemodynamic monitoring, assess perfusion indices   SR / CVP 3  / MAP 78 / Hct 35.0% / Lactate 0.9  Reassessment of hemodynamics post resuscitation   Afib prophylaxis with Lopressor and Amiodarone   Monitor chest tube outputs    [x] Nonbleeding   [x] ASA    Serial EKG and cardiac enzymes     ***Pulmonary***  Supplemental O2 via 4L NC   Encourage incentive spirometry, continue pulse ox monitoring, follow ABGs     ***GI***  [x]  Diet:  Consistent carb     ***Renal***  Continue to monitor I/Os, BUN/Creatinine.   Replete lytes PRN  Joby present [x] positive     ***ID***  Perioperative coverage with Cefuroxime     ***Endocrine***  [x] DM : HbA1c 8.4%               - [x] Insulin gtt               - Need tight glycemic control to prevent wound infection.          Patient requires continuous monitoring with bedside rhythm monitoring, pulse oximetry monitoring, and continuous central venous and arterial pressure monitoring; and intermittent blood gas analysis. Care plan discussed with the ICU care team.   Patient remained critical, at risk for life threatening decompensation.    I have spent 30 minutes providing critical care management to this patient.    By signing my name below, I, Bekah Bermudez, attest that this documentation has been prepared under the direction and in the presence of Jens Montez MD   Electronically signed: Kiran Garvey, 02-17-22 @ 06:29    I, Jens Montez, personally performed the services described in this documentation. all medical record entries made by the scribe were at my direction and in my presence. I have reviewed the chart and agree that the record reflects my personal performance and is accurate and complete  Electronically signed: Jens Montez MD  Patient seen and examined at the bedside.    Remained critically ill on continuous ICU monitoring.    OBJECTIVE:  Vital Signs Last 24 Hrs  T(C): 37.3 (17 Feb 2022 04:00), Max: 37.3 (17 Feb 2022 04:00)  T(F): 99.1 (17 Feb 2022 04:00), Max: 99.1 (17 Feb 2022 04:00)  HR: 97 (17 Feb 2022 06:00) (75 - 97)  BP: --  BP(mean): --  RR: 16 (17 Feb 2022 06:00) (10 - 24)  SpO2: 96% (17 Feb 2022 06:00) (96% - 100%)      Physical Exam:   General: NAD    Neurology: nonfocal   Eyes: bilateral pupils equal and reactive   ENT/Neck:  Neck supple, trachea midline, No JVD   Respiratory: Clear bilaterally   CV: S1S2, no murmurs        [x] Sternal dressing, [x] Mediastinal CT, [x] Pleural CT          [x] Sinus rhythm [x] Temporary pacing   Abdominal: Soft, NT, ND +BS,   Extremities: 1-2+ pedal edema noted, + peripheral pulses   Skin: No Rashes, Hematoma, Ecchymosis                           Assessment:  62 yo male presents to PST prior to scheduled CABG x 3 on 2/16/22 with Dr. Moi Chambers. Pmhx includes HTN, HLD, CAD, DM, ERNESTINE precautions, GERD, history of DVT in LLE (post op ankle surgery in 2019, was on Eliquis x 6 months and then cleared)    CAD s/p C3L POD 2/16   Hypovolemic Shock   Post op respiratory insufficiency   Acute blood loss anemia   Thrombocytopenia   Diabetes mellitus     Plan:   ***Neuro***  [x] Nonfocal     Post operative neuro assessment   Ambulate as tolerated     ***Cardiovascular***  Invasive hemodynamic monitoring, assess perfusion indices   SR / CVP 3  / MAP 78 / Hct 35.0% / Lactate 0.9  Reassessment of hemodynamics post resuscitation   Afib prophylaxis Amiodarone / Lopressor started this AM   Monitor chest tube outputs    [x] Nonbleeding   [x] ASA  / Statin started this AM   VTE prophylaxis with [x] Lovenox started today   Serial EKG and cardiac enzymes     ***Pulmonary***  Supplemental O2 via 4L NC   Encourage incentive spirometry, continue pulse ox monitoring, follow ABGs     ***GI***  [x]  Diet:  Consistent carb     ***Renal***  Continue to monitor I/Os, BUN/Creatinine.   Replete lytes PRN  Hemphill present [x] positive     ***ID***  Perioperative coverage with Cefuroxime     ***Endocrine***  [x] DM : HbA1c 8.4%               - [x] Insulin gtt  / Lantus and Premeal started to help wean off drip today              - Need tight glycemic control to prevent wound infection.          Patient requires continuous monitoring with bedside rhythm monitoring, pulse oximetry monitoring, and continuous central venous and arterial pressure monitoring; and intermittent blood gas analysis. Care plan discussed with the ICU care team.   Patient remained critical, at risk for life threatening decompensation.    I have spent 30 minutes providing critical care management to this patient.    By signing my name below, I, Bekah Bermudez, attest that this documentation has been prepared under the direction and in the presence of Jens Montez MD   Electronically signed: Kiran Garvey, 02-17-22 @ 06:29    I, Jens Montez, personally performed the services described in this documentation. all medical record entries made by the roibfernanda were at my direction and in my presence. I have reviewed the chart and agree that the record reflects my personal performance and is accurate and complete  Electronically signed: Jens Montez MD

## 2022-02-17 NOTE — PHYSICAL THERAPY INITIAL EVALUATION ADULT - ACTIVE RANGE OF MOTION EXAMINATION, REHAB EVAL
sternal precautions maintained/bilateral upper extremity Active ROM was WFL (within functional limits)/bilateral  lower extremity Active ROM was WFL (within functional limits)

## 2022-02-17 NOTE — PHYSICAL THERAPY INITIAL EVALUATION ADULT - GENERAL OBSERVATIONS, REHAB EVAL
pt received seated in chair in NAD +ICU monitoring, a-line, central line, 4L O2 NC, chest tube, montgomery catheter, external pacer, motivated to work with PT

## 2022-02-17 NOTE — PHYSICAL THERAPY INITIAL EVALUATION ADULT - PLANNED THERAPY INTERVENTIONS, PT EVAL
GOAL: Pt will independently negotiate stairs with 1 handrail with step over step pattern in 2wks./balance training/bed mobility training/gait training/strengthening/transfer training

## 2022-02-17 NOTE — PHYSICAL THERAPY INITIAL EVALUATION ADULT - PRECAUTIONS/LIMITATIONS, REHAB EVAL
Had a cardiac angiogram on 12/30/2021 which revealed " LT main 10 to 30% stenosis, proximal, proximal to mid LAD 80% stenosis, ostial 1st diagonal 50% stenosis, ostial and proximal circumflex 50% stenosis, proximal OM3 100% stenosis, proximal % stenosis, proximal to mid RCA 50% stenosis, distal RCA 70% stenosis, mid RPDA 70% stenosis." He was recently evaluated by Dr. Chambers and above surgery was recommended./cardiac precautions/fall precautions/sternal precautions/surgical precautions

## 2022-02-17 NOTE — PHYSICAL THERAPY INITIAL EVALUATION ADULT - ADDITIONAL COMMENTS
Pt lives with spouse in private home 3 steps to enter, full flight to second floor bedroom. Prior to admission pt was independent with all functional mobility +driving, works as .

## 2022-02-17 NOTE — PHYSICAL THERAPY INITIAL EVALUATION ADULT - PERTINENT HX OF CURRENT PROBLEM, REHAB EVAL
60y/o M presents to PST prior to scheduled CABG x 3 on 2/16/22 with Dr. Moi Chambers. Pmhx includes HTN, HLD, CAD, DM, ERNESTINE precautions, GERD, history of DVT in LLE. Reports he feels "fatigued" and ALEJO after working; over the past few months.

## 2022-02-17 NOTE — PROGRESS NOTE ADULT - PROBLEM SELECTOR PLAN 1
Asa, Statin, Chest PT,  Incentive spirometry, wound care and assessment.  Ambulate   Lopreaaor 25 bid

## 2022-02-18 DIAGNOSIS — E66.9 OBESITY, UNSPECIFIED: ICD-10-CM

## 2022-02-18 DIAGNOSIS — I10 ESSENTIAL (PRIMARY) HYPERTENSION: ICD-10-CM

## 2022-02-18 LAB
ALBUMIN SERPL ELPH-MCNC: 3.8 G/DL — SIGNIFICANT CHANGE UP (ref 3.3–5)
ALP SERPL-CCNC: 29 U/L — LOW (ref 40–120)
ALT FLD-CCNC: 17 U/L — SIGNIFICANT CHANGE UP (ref 10–45)
ANION GAP SERPL CALC-SCNC: 10 MMOL/L — SIGNIFICANT CHANGE UP (ref 5–17)
AST SERPL-CCNC: 23 U/L — SIGNIFICANT CHANGE UP (ref 10–40)
BASOPHILS # BLD AUTO: 0.01 K/UL — SIGNIFICANT CHANGE UP (ref 0–0.2)
BASOPHILS NFR BLD AUTO: 0.1 % — SIGNIFICANT CHANGE UP (ref 0–2)
BILIRUB SERPL-MCNC: 0.4 MG/DL — SIGNIFICANT CHANGE UP (ref 0.2–1.2)
BUN SERPL-MCNC: 20 MG/DL — SIGNIFICANT CHANGE UP (ref 7–23)
CALCIUM SERPL-MCNC: 8.9 MG/DL — SIGNIFICANT CHANGE UP (ref 8.4–10.5)
CHLORIDE SERPL-SCNC: 105 MMOL/L — SIGNIFICANT CHANGE UP (ref 96–108)
CO2 SERPL-SCNC: 22 MMOL/L — SIGNIFICANT CHANGE UP (ref 22–31)
CREAT SERPL-MCNC: 0.75 MG/DL — SIGNIFICANT CHANGE UP (ref 0.5–1.3)
EOSINOPHIL # BLD AUTO: 0 K/UL — SIGNIFICANT CHANGE UP (ref 0–0.5)
EOSINOPHIL NFR BLD AUTO: 0 % — SIGNIFICANT CHANGE UP (ref 0–6)
GLUCOSE BLDC GLUCOMTR-MCNC: 151 MG/DL — HIGH (ref 70–99)
GLUCOSE BLDC GLUCOMTR-MCNC: 153 MG/DL — HIGH (ref 70–99)
GLUCOSE BLDC GLUCOMTR-MCNC: 160 MG/DL — HIGH (ref 70–99)
GLUCOSE BLDC GLUCOMTR-MCNC: 163 MG/DL — HIGH (ref 70–99)
GLUCOSE BLDC GLUCOMTR-MCNC: 165 MG/DL — HIGH (ref 70–99)
GLUCOSE BLDC GLUCOMTR-MCNC: 181 MG/DL — HIGH (ref 70–99)
GLUCOSE BLDC GLUCOMTR-MCNC: 194 MG/DL — HIGH (ref 70–99)
GLUCOSE BLDC GLUCOMTR-MCNC: 199 MG/DL — HIGH (ref 70–99)
GLUCOSE BLDC GLUCOMTR-MCNC: 200 MG/DL — HIGH (ref 70–99)
GLUCOSE BLDC GLUCOMTR-MCNC: 207 MG/DL — HIGH (ref 70–99)
GLUCOSE BLDC GLUCOMTR-MCNC: 223 MG/DL — HIGH (ref 70–99)
GLUCOSE SERPL-MCNC: 155 MG/DL — HIGH (ref 70–99)
HCT VFR BLD CALC: 32.1 % — LOW (ref 39–50)
HGB BLD-MCNC: 10.9 G/DL — LOW (ref 13–17)
IMM GRANULOCYTES NFR BLD AUTO: 0.6 % — SIGNIFICANT CHANGE UP (ref 0–1.5)
LYMPHOCYTES # BLD AUTO: 1.25 K/UL — SIGNIFICANT CHANGE UP (ref 1–3.3)
LYMPHOCYTES # BLD AUTO: 9.7 % — LOW (ref 13–44)
MAGNESIUM SERPL-MCNC: 2.2 MG/DL — SIGNIFICANT CHANGE UP (ref 1.6–2.6)
MCHC RBC-ENTMCNC: 29.1 PG — SIGNIFICANT CHANGE UP (ref 27–34)
MCHC RBC-ENTMCNC: 34 GM/DL — SIGNIFICANT CHANGE UP (ref 32–36)
MCV RBC AUTO: 85.8 FL — SIGNIFICANT CHANGE UP (ref 80–100)
MONOCYTES # BLD AUTO: 1.27 K/UL — HIGH (ref 0–0.9)
MONOCYTES NFR BLD AUTO: 9.9 % — SIGNIFICANT CHANGE UP (ref 2–14)
NEUTROPHILS # BLD AUTO: 10.27 K/UL — HIGH (ref 1.8–7.4)
NEUTROPHILS NFR BLD AUTO: 79.7 % — HIGH (ref 43–77)
NRBC # BLD: 0 /100 WBCS — SIGNIFICANT CHANGE UP (ref 0–0)
PHOSPHATE SERPL-MCNC: 2.1 MG/DL — LOW (ref 2.5–4.5)
PLATELET # BLD AUTO: 120 K/UL — LOW (ref 150–400)
POTASSIUM SERPL-MCNC: 4.3 MMOL/L — SIGNIFICANT CHANGE UP (ref 3.5–5.3)
POTASSIUM SERPL-SCNC: 4.3 MMOL/L — SIGNIFICANT CHANGE UP (ref 3.5–5.3)
PROT SERPL-MCNC: 5.7 G/DL — LOW (ref 6–8.3)
RBC # BLD: 3.74 M/UL — LOW (ref 4.2–5.8)
RBC # FLD: 14.7 % — HIGH (ref 10.3–14.5)
SODIUM SERPL-SCNC: 137 MMOL/L — SIGNIFICANT CHANGE UP (ref 135–145)
WBC # BLD: 12.88 K/UL — HIGH (ref 3.8–10.5)
WBC # FLD AUTO: 12.88 K/UL — HIGH (ref 3.8–10.5)

## 2022-02-18 PROCEDURE — 71045 X-RAY EXAM CHEST 1 VIEW: CPT | Mod: 26,77

## 2022-02-18 PROCEDURE — 71045 X-RAY EXAM CHEST 1 VIEW: CPT | Mod: 26

## 2022-02-18 RX ORDER — INSULIN LISPRO 100/ML
VIAL (ML) SUBCUTANEOUS
Refills: 0 | Status: DISCONTINUED | OUTPATIENT
Start: 2022-02-18 | End: 2022-02-20

## 2022-02-18 RX ORDER — INSULIN LISPRO 100/ML
VIAL (ML) SUBCUTANEOUS AT BEDTIME
Refills: 0 | Status: DISCONTINUED | OUTPATIENT
Start: 2022-02-18 | End: 2022-02-20

## 2022-02-18 RX ORDER — DEXTROSE 50 % IN WATER 50 %
15 SYRINGE (ML) INTRAVENOUS ONCE
Refills: 0 | Status: DISCONTINUED | OUTPATIENT
Start: 2022-02-18 | End: 2022-02-20

## 2022-02-18 RX ORDER — DEXTROSE 50 % IN WATER 50 %
25 SYRINGE (ML) INTRAVENOUS ONCE
Refills: 0 | Status: DISCONTINUED | OUTPATIENT
Start: 2022-02-18 | End: 2022-02-20

## 2022-02-18 RX ORDER — INSULIN GLARGINE 100 [IU]/ML
50 INJECTION, SOLUTION SUBCUTANEOUS AT BEDTIME
Refills: 0 | Status: DISCONTINUED | OUTPATIENT
Start: 2022-02-18 | End: 2022-02-19

## 2022-02-18 RX ORDER — DEXTROSE 50 % IN WATER 50 %
12.5 SYRINGE (ML) INTRAVENOUS ONCE
Refills: 0 | Status: DISCONTINUED | OUTPATIENT
Start: 2022-02-18 | End: 2022-02-20

## 2022-02-18 RX ORDER — INSULIN LISPRO 100/ML
12 VIAL (ML) SUBCUTANEOUS
Refills: 0 | Status: DISCONTINUED | OUTPATIENT
Start: 2022-02-18 | End: 2022-02-19

## 2022-02-18 RX ORDER — GLUCAGON INJECTION, SOLUTION 0.5 MG/.1ML
1 INJECTION, SOLUTION SUBCUTANEOUS ONCE
Refills: 0 | Status: DISCONTINUED | OUTPATIENT
Start: 2022-02-18 | End: 2022-02-20

## 2022-02-18 RX ADMIN — Medication 500 MILLIGRAM(S): at 17:41

## 2022-02-18 RX ADMIN — SIMVASTATIN 40 MILLIGRAM(S): 20 TABLET, FILM COATED ORAL at 21:34

## 2022-02-18 RX ADMIN — POLYETHYLENE GLYCOL 3350 17 GRAM(S): 17 POWDER, FOR SOLUTION ORAL at 11:35

## 2022-02-18 RX ADMIN — GABAPENTIN 100 MILLIGRAM(S): 400 CAPSULE ORAL at 14:16

## 2022-02-18 RX ADMIN — ENOXAPARIN SODIUM 40 MILLIGRAM(S): 100 INJECTION SUBCUTANEOUS at 11:35

## 2022-02-18 RX ADMIN — Medication 25 MILLIGRAM(S): at 21:34

## 2022-02-18 RX ADMIN — Medication 5: at 08:11

## 2022-02-18 RX ADMIN — Medication 81 MILLIGRAM(S): at 12:11

## 2022-02-18 RX ADMIN — INSULIN GLARGINE 50 UNIT(S): 100 INJECTION, SOLUTION SUBCUTANEOUS at 21:33

## 2022-02-18 RX ADMIN — SENNA PLUS 2 TABLET(S): 8.6 TABLET ORAL at 21:33

## 2022-02-18 RX ADMIN — Medication 650 MILLIGRAM(S): at 07:31

## 2022-02-18 RX ADMIN — POLYETHYLENE GLYCOL 3350 17 GRAM(S): 17 POWDER, FOR SOLUTION ORAL at 12:12

## 2022-02-18 RX ADMIN — Medication 1: at 12:08

## 2022-02-18 RX ADMIN — Medication 650 MILLIGRAM(S): at 12:11

## 2022-02-18 RX ADMIN — Medication 12 UNIT(S): at 17:43

## 2022-02-18 RX ADMIN — Medication 5 UNIT(S): at 08:10

## 2022-02-18 RX ADMIN — Medication 650 MILLIGRAM(S): at 01:13

## 2022-02-18 RX ADMIN — Medication 1: at 17:42

## 2022-02-18 RX ADMIN — Medication 25 MILLIGRAM(S): at 13:03

## 2022-02-18 RX ADMIN — Medication 12 UNIT(S): at 12:12

## 2022-02-18 RX ADMIN — AMIODARONE HYDROCHLORIDE 400 MILLIGRAM(S): 400 TABLET ORAL at 17:41

## 2022-02-18 RX ADMIN — Medication 650 MILLIGRAM(S): at 11:35

## 2022-02-18 RX ADMIN — Medication 145 MILLIGRAM(S): at 11:36

## 2022-02-18 RX ADMIN — GABAPENTIN 100 MILLIGRAM(S): 400 CAPSULE ORAL at 21:34

## 2022-02-18 NOTE — PROGRESS NOTE ADULT - ASSESSMENT
60 yo male presents to PST prior to scheduled CABG x 3 on 2/16/22 with Dr. Moi Chambers. Pmhx includes HTN, HLD, CAD, DM, ERNESTINE precautions, GERD, history of DVT in LLE (post op ankle surgery in 2019, was on Eliquis x 6 months and then cleared). Reports he feels "fatigued" and ALEJO after working; over the past few months. He denies chest pain, palpitations, SOB at rest, dizziness, syncope. Had a cardiac angiogram on 12/30/2021 which revealed " LT main 10 to 30% stenosis, proximal, proximal to mid LAD 80% stenosis, ostial 1st diagonal 50% stenosis, ostial and proximal circumflex 50% stenosis, proximal OM3 100% stenosis, proximal % stenosis, proximal to mid RCA 50% stenosis, distal RCA 70% stenosis, mid RPDA 70% stenosis." He was recently evaluated by Dr. Chambers and above surgery was recommended.  2/16/22/p CABGx3 (LIMA-LAD, SVG-Diag, SVG-LPL) ef 60  No blood products, patient received one unit of their own blood back at end of case  Extubated d 0  Insulin infusion  2/17 transferred to sdu  2/18 POD #2, Med and LPT removed, R radial a-line removed, Intro removed, wean insulin gtt

## 2022-02-18 NOTE — PROGRESS NOTE ADULT - ASSESSMENT
62 yo male presents to PST prior to scheduled CABG x 3 on 2/16/22 with Dr. Moi Chambers. Pmhx includes HTN, HLD, CAD, DM, ERNESTINE precautions, GERD, history of DVT in LLE (post op ankle surgery in 2019, was on Eliquis x 6 months and then cleared). Reports he feels "fatigued" and ALEJO after working; over the past few months. He denies chest pain, palpitations, SOB at rest, dizziness, syncope. Had a cardiac angiogram on 12/30/2021 which revealed " LT main 10 to 30% stenosis, proximal, proximal to mid LAD 80% stenosis, ostial 1st diagonal 50% stenosis, ostial and proximal circumflex 50% stenosis, proximal OM3 100% stenosis, proximal % stenosis, proximal to mid RCA 50% stenosis, distal RCA 70% stenosis, mid RPDA 70% stenosis." He was recently evaluated by Dr. Chambers and above surgery was recommended.  2/16/22/p CABGx3 (LIMA-LAD, SVG-Diag, SVG-LPL) ef 60  No blood products, patient received one unit of their own blood back at end of case  Extubated d 0  Insulin infusion  2/17 transferred to sdu  2/18 POD #2, Med and LPT removed, R radial a-line removed, Intro removed, wean insulin gtt

## 2022-02-18 NOTE — CONSULT NOTE ADULT - SUBJECTIVE AND OBJECTIVE BOX
HPI:  60 yo male presents to [facility] prior to scheduled CABG x 3 on 22 with Dr. Moi Chambers. Pmhx includes HTN, HLD, CAD, DM, ERNESTINE precautions, GERD, history of DVT in LLE (post op ankle surgery in 2019, was on Eliquis x 6 months and then cleared). Reports he feels "fatigued" and ALEJO after working; over the past few months. He denies chest pain, palpitations, SOB at rest, dizziness, syncope. Had a cardiac angiogram on 2021 which revealed " LT main 10 to 30% stenosis, proximal, proximal to mid LAD 80% stenosis, ostial 1st diagonal 50% stenosis, ostial and proximal circumflex 50% stenosis, proximal OM3 100% stenosis, proximal % stenosis, proximal to mid RCA 50% stenosis, distal RCA 70% stenosis, mid RPDA 70% stenosis." He was recently evaluated by Dr. Chambers and above surgery was recommended.    Patient denies previous Covid19 infection/exposure,recent travel,fevers,chills,cough,SOB,loss of sense of smell/taste.  Covid19 PCR performed at [facility] today. (2022 11:44)  Patient has history of diabetes, A1C 8.4%, on oral meds and insulin at home, no recent hypoglycemic episodes, no polyuria polydipsia. Patient follows up with PCP for diabetes management.  Endo was consulted for glycemic control.    PAST MEDICAL & SURGICAL HISTORY:  Diabetes    HTN (hypertension)    HLD (hyperlipidemia)    CAD (coronary artery disease)    GERD (gastroesophageal reflux disease)    History of DVT in adulthood  2019, post op in LLE; treated with Eliquis x 6 months, now in ASA 81    H/O fracture of foot  screw and bone graft in left foot, 2019, complicated w/ DVT in LLE    H/O fracture of arm  age 10, LUE    History of tonsillectomy    H/O colonoscopy    S/P cataract extraction and insertion of intraocular lens        FAMILY HISTORY:  FH: heart disease  MI (brother,  age 45), CHF (mother,  age 62)    FH: ovarian cancer (Sibling)    FH: type 2 diabetes (Mother)    FH: arrhythmia (Child)    Family history of other condition (Sibling)  Mastocytosis        Social History:    Outpatient Medications:    MEDICATIONS  (STANDING):  acetaminophen     Tablet .. 650 milliGRAM(s) Oral every 6 hours  aMIOdarone    Tablet 400 milliGRAM(s) Oral two times a day  ascorbic acid 500 milliGRAM(s) Oral two times a day  aspirin enteric coated 81 milliGRAM(s) Oral daily  bisacodyl Suppository 10 milliGRAM(s) Rectal once  chlorhexidine 2% Cloths 1 Application(s) Topical daily  dextrose 40% Gel 15 Gram(s) Oral once  dextrose 50% Injectable 25 Gram(s) IV Push once  dextrose 50% Injectable 12.5 Gram(s) IV Push once  dextrose 50% Injectable 25 Gram(s) IV Push once  enoxaparin Injectable 40 milliGRAM(s) SubCutaneous daily  fenofibrate Tablet 145 milliGRAM(s) Oral daily  gabapentin 100 milliGRAM(s) Oral every 8 hours  glucagon  Injectable 1 milliGRAM(s) IntraMuscular once  insulin glargine Injectable (LANTUS) 50 Unit(s) SubCutaneous at bedtime  insulin lispro (ADMELOG) corrective regimen sliding scale   SubCutaneous three times a day before meals  insulin lispro (ADMELOG) corrective regimen sliding scale   SubCutaneous at bedtime  insulin lispro (ADMELOG) corrective regimen sliding scale   SubCutaneous three times a day before meals  insulin lispro (ADMELOG) corrective regimen sliding scale   SubCutaneous at bedtime  insulin lispro Injectable (ADMELOG) 12 Unit(s) SubCutaneous three times a day before meals  metoprolol tartrate 25 milliGRAM(s) Oral every 8 hours  polyethylene glycol 3350 17 Gram(s) Oral daily  polyethylene glycol 3350 17 Gram(s) Oral daily  senna 2 Tablet(s) Oral at bedtime  simvastatin 40 milliGRAM(s) Oral at bedtime  sodium chloride 0.9%. 1000 milliLiter(s) (10 mL/Hr) IV Continuous <Continuous>    MEDICATIONS  (PRN):  HYDROmorphone  Injectable 0.5 milliGRAM(s) IV Push every 6 hours PRN Severe Pain (7 - 10)  oxyCODONE    IR 5 milliGRAM(s) Oral every 4 hours PRN Moderate Pain (4 - 6)  oxyCODONE    IR 10 milliGRAM(s) Oral every 4 hours PRN Severe Pain (7 - 10)      Allergies    Lipitor (Other)    Intolerances      Review of Systems:  Constitutional: No fever, no chills  Eyes: No blurry vision  Neuro: No tremors  HEENT: No pain, no neck swelling  Cardiovascular: No chest pain, no palpitations  Respiratory: Has SOB, no cough  GI: No nausea, vomiting, abdominal pain  : No dysuria  Skin: no rash  MSK: Has leg swelling.  Psych: no depression  Endocrine: no polyuria, polydipsia    ALL OTHER SYSTEMS REVIEWED AND NEGATIVE    UNABLE TO OBTAIN    PHYSICAL EXAM:  VITALS: T(C): 36.7 (22 @ 11:26)  T(F): 98 (22 @ 11:26), Max: 98.7 (22 @ 15:00)  HR: 71 (22 @ 11:26) (67 - 88)  BP: 107/62 (22 @ 11:26) (92/54 - 121/60)  RR:  (18 - 18)  SpO2:  (95% - 100%)  Wt(kg): --  GENERAL: NAD, well-groomed, well-developed  EYES: No proptosis, no lid lag  HEENT:  Atraumatic, Normocephalic  THYROID: Normal size, no palpable nodules  RESPIRATORY: Clear to auscultation bilaterally; No rales, rhonchi, wheezing  CARDIOVASCULAR: Si S2, No murmurs;  GI: Soft, non distended, normal bowel sounds  SKIN: Dry, intact, No rashes or lesions  MUSCULOSKELETAL: Has BL lower extremity edema.  NEURO:  no tremor, sensation decreased in feet BL,    POCT Blood Glucose.: 160 mg/dL (22 @ 12:04)  POCT Blood Glucose.: 200 mg/dL (22 @ 11:03)  POCT Blood Glucose.: 207 mg/dL (22 @ 10:09)  POCT Blood Glucose.: 223 mg/dL (22 @ 09:03)  POCT Blood Glucose.: 151 mg/dL (22 @ 08:01)  POCT Blood Glucose.: 160 mg/dL (22 @ 06:44)  POCT Blood Glucose.: 160 mg/dL (22 @ 05:56)  POCT Blood Glucose.: 165 mg/dL (22 @ 05:08)  POCT Blood Glucose.: 194 mg/dL (22 @ 03:49)  POCT Blood Glucose.: 199 mg/dL (22 @ 02:39)  POCT Blood Glucose.: 153 mg/dL (22 @ 00:23)  POCT Blood Glucose.: 170 mg/dL (22 @ 22:01)  POCT Blood Glucose.: 202 mg/dL (22 @ 20:43)  POCT Blood Glucose.: 181 mg/dL (22 @ 18:44)  POCT Blood Glucose.: 178 mg/dL (22 @ 17:55)  POCT Blood Glucose.: 193 mg/dL (22 @ 16:43)  POCT Blood Glucose.: 200 mg/dL (22 @ 15:56)  POCT Blood Glucose.: 209 mg/dL (22 @ 14:48)  POCT Blood Glucose.: 229 mg/dL (22 @ 13:58)  POCT Blood Glucose.: 207 mg/dL (22 @ 12:40)  POCT Blood Glucose.: 204 mg/dL (22 @ 11:42)  POCT Blood Glucose.: 205 mg/dL (22 @ 11:03)  POCT Blood Glucose.: 197 mg/dL (22 @ 09:53)  POCT Blood Glucose.: 175 mg/dL (22 @ 09:01)  POCT Blood Glucose.: 144 mg/dL (22 @ 07:51)  POCT Blood Glucose.: 155 mg/dL (22 @ 06:44)  POCT Blood Glucose.: 164 mg/dL (22 @ 06:00)  POCT Blood Glucose.: 134 mg/dL (22 @ 04:56)  POCT Blood Glucose.: 135 mg/dL (22 @ 03:58)  POCT Blood Glucose.: 130 mg/dL (22 @ 02:56)  POCT Blood Glucose.: 131 mg/dL (22 @ 01:59)  POCT Blood Glucose.: 130 mg/dL (22 @ 01:00)  POCT Blood Glucose.: 131 mg/dL (22 @ 23:56)  POCT Blood Glucose.: 135 mg/dL (22 @ 23:06)  POCT Blood Glucose.: 144 mg/dL (22 @ 22:14)  POCT Blood Glucose.: 149 mg/dL (22 @ 20:57)  POCT Blood Glucose.: 149 mg/dL (22 @ 19:56)  POCT Blood Glucose.: 145 mg/dL (22 @ 19:04)  POCT Blood Glucose.: 156 mg/dL (22 @ 18:21)  POCT Blood Glucose.: 171 mg/dL (22 @ 17:08)  POCT Blood Glucose.: 176 mg/dL (22 @ 16:10)  POCT Blood Glucose.: 174 mg/dL (22 @ 15:10)  POCT Blood Glucose.: 271 mg/dL (22 @ 05:44)                            10.9   12.88 )-----------( 120      ( 2022 06:28 )             32.1           137  |  105  |  20  ----------------------------<  155<H>  4.3   |  22  |  0.75    EGFR if : 116  EGFR if non : 100    Ca    8.9        Mg     2.2       Phos  2.1         TPro  5.7<L>  /  Alb  3.8  /  TBili  0.4  /  DBili  x   /  AST  23  /  ALT  17  /  AlkPhos  29<L>        Thyroid Function Tests:              Radiology:                HPI:  62 yo male presents to [facility] prior to scheduled CABG x 3 on 22 with Dr. Moi Chambers. Pmhx includes HTN, HLD, CAD, DM, ERNESTINE precautions, GERD, history of DVT in LLE (post op ankle surgery in 2019, was on Eliquis x 6 months and then cleared). Reports he feels "fatigued" and ALEJO after working; over the past few months. He denies chest pain, palpitations, SOB at rest, dizziness, syncope. Had a cardiac angiogram on 2021 which revealed " LT main 10 to 30% stenosis, proximal, proximal to mid LAD 80% stenosis, ostial 1st diagonal 50% stenosis, ostial and proximal circumflex 50% stenosis, proximal OM3 100% stenosis, proximal % stenosis, proximal to mid RCA 50% stenosis, distal RCA 70% stenosis, mid RPDA 70% stenosis." He was recently evaluated by Dr. Chambers and above surgery was recommended.    Patient denies previous Covid19 infection/exposure,recent travel,fevers,chills,cough,SOB,loss of sense of smell/taste.  Covid19 PCR performed at [facility] today. (2022 11:44)  Patient has history of diabetes, A1C 8.4%, on oral meds and insulin at home, no recent hypoglycemic episodes, no polyuria polydipsia. Patient follows up with PCP for diabetes management.  Endo was consulted for glycemic control.    PAST MEDICAL & SURGICAL HISTORY:  Diabetes    HTN (hypertension)    HLD (hyperlipidemia)    CAD (coronary artery disease)    GERD (gastroesophageal reflux disease)    History of DVT in adulthood  2019, post op in LLE; treated with Eliquis x 6 months, now in ASA 81    H/O fracture of foot  screw and bone graft in left foot, 2019, complicated w/ DVT in LLE    H/O fracture of arm  age 10, LUE    History of tonsillectomy    H/O colonoscopy    S/P cataract extraction and insertion of intraocular lens        FAMILY HISTORY:  FH: heart disease  MI (brother,  age 45), CHF (mother,  age 62)    FH: ovarian cancer (Sibling)    FH: type 2 diabetes (Mother)    FH: arrhythmia (Child)    Family history of other condition (Sibling)  Mastocytosis        Social History:    Outpatient Medications:    MEDICATIONS  (STANDING):  acetaminophen     Tablet .. 650 milliGRAM(s) Oral every 6 hours  aMIOdarone    Tablet 400 milliGRAM(s) Oral two times a day  ascorbic acid 500 milliGRAM(s) Oral two times a day  aspirin enteric coated 81 milliGRAM(s) Oral daily  bisacodyl Suppository 10 milliGRAM(s) Rectal once  chlorhexidine 2% Cloths 1 Application(s) Topical daily  dextrose 40% Gel 15 Gram(s) Oral once  dextrose 50% Injectable 25 Gram(s) IV Push once  dextrose 50% Injectable 12.5 Gram(s) IV Push once  dextrose 50% Injectable 25 Gram(s) IV Push once  enoxaparin Injectable 40 milliGRAM(s) SubCutaneous daily  fenofibrate Tablet 145 milliGRAM(s) Oral daily  gabapentin 100 milliGRAM(s) Oral every 8 hours  glucagon  Injectable 1 milliGRAM(s) IntraMuscular once  insulin glargine Injectable (LANTUS) 50 Unit(s) SubCutaneous at bedtime  insulin lispro (ADMELOG) corrective regimen sliding scale   SubCutaneous three times a day before meals  insulin lispro (ADMELOG) corrective regimen sliding scale   SubCutaneous at bedtime  insulin lispro (ADMELOG) corrective regimen sliding scale   SubCutaneous three times a day before meals  insulin lispro (ADMELOG) corrective regimen sliding scale   SubCutaneous at bedtime  insulin lispro Injectable (ADMELOG) 12 Unit(s) SubCutaneous three times a day before meals  metoprolol tartrate 25 milliGRAM(s) Oral every 8 hours  polyethylene glycol 3350 17 Gram(s) Oral daily  polyethylene glycol 3350 17 Gram(s) Oral daily  senna 2 Tablet(s) Oral at bedtime  simvastatin 40 milliGRAM(s) Oral at bedtime  sodium chloride 0.9%. 1000 milliLiter(s) (10 mL/Hr) IV Continuous <Continuous>    MEDICATIONS  (PRN):  HYDROmorphone  Injectable 0.5 milliGRAM(s) IV Push every 6 hours PRN Severe Pain (7 - 10)  oxyCODONE    IR 5 milliGRAM(s) Oral every 4 hours PRN Moderate Pain (4 - 6)  oxyCODONE    IR 10 milliGRAM(s) Oral every 4 hours PRN Severe Pain (7 - 10)      Allergies    Lipitor (Other)    Intolerances      Review of Systems:  Constitutional: No fever, no chills  Eyes: No blurry vision  Neuro: No tremors  HEENT: No pain, no neck swelling  Cardiovascular: No chest pain, no palpitations  Respiratory: Has SOB, no cough  GI: No nausea, vomiting, abdominal pain  : No dysuria  Skin: no rash  MSK: Has leg swelling.  Psych: no depression  Endocrine: no polyuria, polydipsia    ALL OTHER SYSTEMS REVIEWED AND NEGATIVE    UNABLE TO OBTAIN    PHYSICAL EXAM:  VITALS: T(C): 36.7 (22 @ 11:26)  T(F): 98 (22 @ 11:26), Max: 98.7 (22 @ 15:00)  HR: 71 (22 @ 11:26) (67 - 88)  BP: 107/62 (22 @ 11:26) (92/54 - 121/60)  RR:  (18 - 18)  SpO2:  (95% - 100%)  Wt(kg): --  GENERAL: NAD, well-groomed, well-developed  EYES: No proptosis, no lid lag  HEENT:  Atraumatic, Normocephalic  THYROID: Normal size, no palpable nodules  RESPIRATORY: Clear to auscultation bilaterally; No rales, rhonchi, wheezing  CARDIOVASCULAR: Si S2, No murmurs;  GI: Soft, non distended, normal bowel sounds  SKIN: Dry, intact, No rashes or lesions  MUSCULOSKELETAL: Has BL lower extremity edema.  NEURO:  no tremor, sensation decreased in feet BL,    POCT Blood Glucose.: 160 mg/dL (22 @ 12:04)  POCT Blood Glucose.: 200 mg/dL (22 @ 11:03)  POCT Blood Glucose.: 207 mg/dL (22 @ 10:09)  POCT Blood Glucose.: 223 mg/dL (22 @ 09:03)  POCT Blood Glucose.: 151 mg/dL (22 @ 08:01)  POCT Blood Glucose.: 160 mg/dL (22 @ 06:44)  POCT Blood Glucose.: 160 mg/dL (22 @ 05:56)  POCT Blood Glucose.: 165 mg/dL (22 @ 05:08)  POCT Blood Glucose.: 194 mg/dL (22 @ 03:49)  POCT Blood Glucose.: 199 mg/dL (22 @ 02:39)  POCT Blood Glucose.: 153 mg/dL (22 @ 00:23)  POCT Blood Glucose.: 170 mg/dL (22 @ 22:01)  POCT Blood Glucose.: 202 mg/dL (22 @ 20:43)  POCT Blood Glucose.: 181 mg/dL (22 @ 18:44)  POCT Blood Glucose.: 178 mg/dL (22 @ 17:55)  POCT Blood Glucose.: 193 mg/dL (22 @ 16:43)  POCT Blood Glucose.: 200 mg/dL (22 @ 15:56)  POCT Blood Glucose.: 209 mg/dL (22 @ 14:48)  POCT Blood Glucose.: 229 mg/dL (22 @ 13:58)  POCT Blood Glucose.: 207 mg/dL (22 @ 12:40)  POCT Blood Glucose.: 204 mg/dL (22 @ 11:42)  POCT Blood Glucose.: 205 mg/dL (22 @ 11:03)  POCT Blood Glucose.: 197 mg/dL (22 @ 09:53)  POCT Blood Glucose.: 175 mg/dL (22 @ 09:01)  POCT Blood Glucose.: 144 mg/dL (22 @ 07:51)  POCT Blood Glucose.: 155 mg/dL (22 @ 06:44)  POCT Blood Glucose.: 164 mg/dL (22 @ 06:00)  POCT Blood Glucose.: 134 mg/dL (22 @ 04:56)  POCT Blood Glucose.: 135 mg/dL (22 @ 03:58)  POCT Blood Glucose.: 130 mg/dL (22 @ 02:56)  POCT Blood Glucose.: 131 mg/dL (22 @ 01:59)  POCT Blood Glucose.: 130 mg/dL (22 @ 01:00)  POCT Blood Glucose.: 131 mg/dL (22 @ 23:56)  POCT Blood Glucose.: 135 mg/dL (22 @ 23:06)  POCT Blood Glucose.: 144 mg/dL (22 @ 22:14)  POCT Blood Glucose.: 149 mg/dL (22 @ 20:57)  POCT Blood Glucose.: 149 mg/dL (22 @ 19:56)  POCT Blood Glucose.: 145 mg/dL (22 @ 19:04)  POCT Blood Glucose.: 156 mg/dL (22 @ 18:21)  POCT Blood Glucose.: 171 mg/dL (22 @ 17:08)  POCT Blood Glucose.: 176 mg/dL (22 @ 16:10)  POCT Blood Glucose.: 174 mg/dL (22 @ 15:10)  POCT Blood Glucose.: 271 mg/dL (22 @ 05:44)                            10.9   12.88 )-----------( 120      ( 2022 06:28 )             32.1           137  |  105  |  20  ----------------------------<  155<H>  4.3   |  22  |  0.75    EGFR if : 116  EGFR if non : 100    Ca    8.9        Mg     2.2       Phos  2.1         TPro  5.7<L>  /  Alb  3.8  /  TBili  0.4  /  DBili  x   /  AST  23  /  ALT  17  /  AlkPhos  29<L>        Thyroid Function Tests:              Radiology:

## 2022-02-18 NOTE — PROGRESS NOTE ADULT - PROBLEM SELECTOR PLAN 1
C3L with Dr. Chambers on 2/16  ASA 81, Simvastatin 40mg, Lopressor 25 TID  Chest PT,  Incentive spirometry, pain management  Increase ambulation  ERP protocol  Monitor I&Os

## 2022-02-18 NOTE — PROGRESS NOTE ADULT - SUBJECTIVE AND OBJECTIVE BOX
VITAL SIGNS    Telemetry:  SR 60-90  Vital Signs Last 24 Hrs  T(C): 36.7 (22 @ 06:27), Max: 37.3 (22 @ 12:00)  T(F): 98 (22 @ 06:27), Max: 99.1 (22 @ 12:00)  HR: 73 (22 @ 06:27) (67 - 93)  BP: 109/60 (22 @ 06:27) (92/54 - 121/60)  RR: 18 (22 @ 06:27) (18 - 26)  SpO2: 96% (22 @ 06:27) (95% - 100%)             @ 07:01  -   07:00  --------------------------------------------------------  IN: 1625.5 mL / OUT: 1660 mL / NET: -34.5 mL       Daily     Daily Weight in k.9 (2022 06:00)  Admit Wt: Drug Dosing Weight  Height (cm): 172.7 (2022 13:45)  Weight (kg): 101 (2022 13:45)  BMI (kg/m2): 33.9 (2022 13:45)  BSA (m2): 2.14 (2022 13:45)    Bilirubin Total, Serum: 0.4 mg/dL ( @ 06:28)    CAPILLARY BLOOD GLUCOSE  199 (2022 03:00)      POCT Blood Glucose.: 223 mg/dL (2022 09:03)  POCT Blood Glucose.: 151 mg/dL (2022 08:01)  POCT Blood Glucose.: 160 mg/dL (2022 06:44)  POCT Blood Glucose.: 160 mg/dL (2022 05:56)  POCT Blood Glucose.: 165 mg/dL (2022 05:08)  POCT Blood Glucose.: 194 mg/dL (2022 03:49)  POCT Blood Glucose.: 199 mg/dL (2022 02:39)  POCT Blood Glucose.: 153 mg/dL (2022 00:23)  POCT Blood Glucose.: 170 mg/dL (2022 22:01)  POCT Blood Glucose.: 202 mg/dL (2022 20:43)  POCT Blood Glucose.: 181 mg/dL (2022 18:44)  POCT Blood Glucose.: 178 mg/dL (2022 17:55)  POCT Blood Glucose.: 193 mg/dL (2022 16:43)  POCT Blood Glucose.: 200 mg/dL (2022 15:56)  POCT Blood Glucose.: 209 mg/dL (2022 14:48)  POCT Blood Glucose.: 229 mg/dL (2022 13:58)  POCT Blood Glucose.: 207 mg/dL (2022 12:40)  POCT Blood Glucose.: 204 mg/dL (2022 11:42)  POCT Blood Glucose.: 205 mg/dL (2022 11:03)  POCT Blood Glucose.: 197 mg/dL (2022 09:53)          acetaminophen     Tablet .. 650 milliGRAM(s) Oral every 6 hours  aMIOdarone    Tablet 400 milliGRAM(s) Oral two times a day  ascorbic acid 500 milliGRAM(s) Oral two times a day  aspirin enteric coated 81 milliGRAM(s) Oral daily  bisacodyl Suppository 10 milliGRAM(s) Rectal once  chlorhexidine 2% Cloths 1 Application(s) Topical daily  enoxaparin Injectable 40 milliGRAM(s) SubCutaneous daily  fenofibrate Tablet 145 milliGRAM(s) Oral daily  gabapentin 100 milliGRAM(s) Oral every 8 hours  HYDROmorphone  Injectable 0.5 milliGRAM(s) IV Push every 6 hours PRN  insulin glargine Injectable (LANTUS) 15 Unit(s) SubCutaneous at bedtime  insulin lispro (ADMELOG) corrective regimen sliding scale   SubCutaneous three times a day before meals  insulin lispro Injectable (ADMELOG) 5 Unit(s) SubCutaneous before breakfast  insulin lispro Injectable (ADMELOG) 5 Unit(s) SubCutaneous before lunch  insulin lispro Injectable (ADMELOG) 5 Unit(s) SubCutaneous before dinner  insulin regular Infusion 2 Unit(s)/Hr IV Continuous <Continuous>  metoprolol tartrate 25 milliGRAM(s) Oral every 8 hours  oxyCODONE    IR 5 milliGRAM(s) Oral every 4 hours PRN  oxyCODONE    IR 10 milliGRAM(s) Oral every 4 hours PRN  polyethylene glycol 3350 17 Gram(s) Oral daily  polyethylene glycol 3350 17 Gram(s) Oral daily  senna 2 Tablet(s) Oral at bedtime  simvastatin 40 milliGRAM(s) Oral at bedtime  sodium chloride 0.9%. 1000 milliLiter(s) IV Continuous <Continuous>                            10.9   12.88 )-----------( 120      ( 2022 06:28 )             32.1     02-18    137  |  105  |  20  ----------------------------<  155<H>  4.3   |  22  |  0.75    Ca    8.9      2022 06:28  Phos  2.1     -18  Mg     2.2     -18    TPro  5.7<L>  /  Alb  3.8  /  TBili  0.4  /  DBili  x   /  AST  23  /  ALT  17  /  AlkPhos  29<L>  -18      PHYSICAL EXAM    Subjective: "Hi."   Neurology: alert and oriented x 3, nonfocal, no gross deficits  CV : S1/S2, no murmurs/rubs/gallops  Sternal Wound :  CDI with dressing, Stable, +PW, +LPT, +Med- removed today  Lungs: clear. RR easy, unlabored   Abdomen: soft, nontender, nondistended, positive bowel sounds, -bowel movement   Neg N/V/D   :  pt voiding without difficulty   Extremities:  CAPPS; no edema, neg calf tenderness. PPP bilaterally, groins stable    Drains:     MS         [ x ] Drainage: 0cc 12hr/50cc 24hr serosanguinous               L Pleural  [x]  Drainage: 80cc 12hr/180cc 24hr serosanguinous         Pacing Wires        [x]   Settings:  VVI 50/8                                              VITAL SIGNS    Telemetry:  SR 60-90  Vital Signs Last 24 Hrs  T(C): 36.7 (22 @ 06:27), Max: 37.3 (22 @ 12:00)  T(F): 98 (22 @ 06:27), Max: 99.1 (22 @ 12:00)  HR: 73 (22 @ 06:27) (67 - 93)  BP: 109/60 (22 @ 06:27) (92/54 - 121/60)  RR: 18 (22 @ 06:27) (18 - 26)  SpO2: 96% (22 @ 06:27) (95% - 100%)             @ 07:01  -   07:00  --------------------------------------------------------  IN: 1625.5 mL / OUT: 1660 mL / NET: -34.5 mL       Daily     Daily Weight in k.9 (2022 06:00)  Admit Wt: Drug Dosing Weight  Height (cm): 172.7 (2022 13:45)  Weight (kg): 101 (2022 13:45)  BMI (kg/m2): 33.9 (2022 13:45)  BSA (m2): 2.14 (2022 13:45)    Bilirubin Total, Serum: 0.4 mg/dL ( @ 06:28)    CAPILLARY BLOOD GLUCOSE  199 (2022 03:00)      POCT Blood Glucose.: 223 mg/dL (2022 09:03)  POCT Blood Glucose.: 151 mg/dL (2022 08:01)  POCT Blood Glucose.: 160 mg/dL (2022 06:44)  POCT Blood Glucose.: 160 mg/dL (2022 05:56)  POCT Blood Glucose.: 165 mg/dL (2022 05:08)  POCT Blood Glucose.: 194 mg/dL (2022 03:49)  POCT Blood Glucose.: 199 mg/dL (2022 02:39)  POCT Blood Glucose.: 153 mg/dL (2022 00:23)  POCT Blood Glucose.: 170 mg/dL (2022 22:01)  POCT Blood Glucose.: 202 mg/dL (2022 20:43)  POCT Blood Glucose.: 181 mg/dL (2022 18:44)  POCT Blood Glucose.: 178 mg/dL (2022 17:55)  POCT Blood Glucose.: 193 mg/dL (2022 16:43)  POCT Blood Glucose.: 200 mg/dL (2022 15:56)  POCT Blood Glucose.: 209 mg/dL (2022 14:48)  POCT Blood Glucose.: 229 mg/dL (2022 13:58)  POCT Blood Glucose.: 207 mg/dL (2022 12:40)  POCT Blood Glucose.: 204 mg/dL (2022 11:42)  POCT Blood Glucose.: 205 mg/dL (2022 11:03)  POCT Blood Glucose.: 197 mg/dL (2022 09:53)          acetaminophen     Tablet .. 650 milliGRAM(s) Oral every 6 hours  aMIOdarone    Tablet 400 milliGRAM(s) Oral two times a day  ascorbic acid 500 milliGRAM(s) Oral two times a day  aspirin enteric coated 81 milliGRAM(s) Oral daily  bisacodyl Suppository 10 milliGRAM(s) Rectal once  chlorhexidine 2% Cloths 1 Application(s) Topical daily  enoxaparin Injectable 40 milliGRAM(s) SubCutaneous daily  fenofibrate Tablet 145 milliGRAM(s) Oral daily  gabapentin 100 milliGRAM(s) Oral every 8 hours  HYDROmorphone  Injectable 0.5 milliGRAM(s) IV Push every 6 hours PRN  insulin glargine Injectable (LANTUS) 15 Unit(s) SubCutaneous at bedtime  insulin lispro (ADMELOG) corrective regimen sliding scale   SubCutaneous three times a day before meals  insulin lispro Injectable (ADMELOG) 5 Unit(s) SubCutaneous before breakfast  insulin lispro Injectable (ADMELOG) 5 Unit(s) SubCutaneous before lunch  insulin lispro Injectable (ADMELOG) 5 Unit(s) SubCutaneous before dinner  insulin regular Infusion 2 Unit(s)/Hr IV Continuous <Continuous>  metoprolol tartrate 25 milliGRAM(s) Oral every 8 hours  oxyCODONE    IR 5 milliGRAM(s) Oral every 4 hours PRN  oxyCODONE    IR 10 milliGRAM(s) Oral every 4 hours PRN  polyethylene glycol 3350 17 Gram(s) Oral daily  polyethylene glycol 3350 17 Gram(s) Oral daily  senna 2 Tablet(s) Oral at bedtime  simvastatin 40 milliGRAM(s) Oral at bedtime  sodium chloride 0.9%. 1000 milliLiter(s) IV Continuous <Continuous>                            10.9   12.88 )-----------( 120      ( 2022 06:28 )             32.1     02-18    137  |  105  |  20  ----------------------------<  155<H>  4.3   |  22  |  0.75    Ca    8.9      2022 06:28  Phos  2.1     -18  Mg     2.2     -18    TPro  5.7<L>  /  Alb  3.8  /  TBili  0.4  /  DBili  x   /  AST  23  /  ALT  17  /  AlkPhos  29<L>  -18      PHYSICAL EXAM    Subjective: "Hi."   Neurology: alert and oriented x 3, nonfocal, no gross deficits  CV : S1/S2, no murmurs/rubs/gallops  Sternal Wound :  CDI with dressing, Stable, +PW, +LPT, +Med- removed today  Lungs: clear. RR easy, unlabored   Abdomen: soft, nontender, nondistended, positive bowel sounds, -bowel movement   Neg N/V/D   :  pt voiding without difficulty   Extremities:  CAPPS; no edema, neg calf tenderness. PPP bilaterally, groins stable, L SVG with compression dressing    Drains:     MS         [ x ] Drainage: 0cc 12hr/50cc 24hr serosanguinous               L Pleural  [x]  Drainage: 80cc 12hr/180cc 24hr serosanguinous         Pacing Wires        [x]   Settings:  VVI 50/8

## 2022-02-18 NOTE — PROGRESS NOTE ADULT - PROBLEM SELECTOR PLAN 1
C3L with Dr. Chambers on 2/16  ASA 81, Simvastatin 40mg, Lopressor 25 TID  Chest PT,  Incentive spirometry, pain management  Increase ambulation  ERP protocol  Monitor I&Os none

## 2022-02-18 NOTE — PROGRESS NOTE ADULT - SUBJECTIVE AND OBJECTIVE BOX
VITAL SIGNS    Telemetry:      Vital Signs Last 24 Hrs  T(C): 36.7 (22 @ 06:27), Max: 37.3 (22 @ 12:00)  T(F): 98 (22 @ 06:27), Max: 99.1 (22 @ 12:00)  HR: 73 (22 @ 06:27) (67 - 93)  BP: 109/60 (22 @ 06:27) (92/54 - 121/60)  RR: 18 (22 @ 06:27) (18 - 26)  SpO2: 96% (22 @ 06:27) (95% - 100%)                    @ 07:01  -   07:00  --------------------------------------------------------  IN: 1625.5 mL / OUT: 1660 mL / NET: -34.5 mL          Daily     Daily Weight in k.9 (2022 06:00)            CAPILLARY BLOOD GLUCOSE  199 (2022 03:00)      POCT Blood Glucose.: 207 mg/dL (2022 10:09)  POCT Blood Glucose.: 223 mg/dL (2022 09:03)  POCT Blood Glucose.: 151 mg/dL (2022 08:01)  POCT Blood Glucose.: 160 mg/dL (2022 06:44)  POCT Blood Glucose.: 160 mg/dL (2022 05:56)  POCT Blood Glucose.: 165 mg/dL (2022 05:08)  POCT Blood Glucose.: 194 mg/dL (2022 03:49)  POCT Blood Glucose.: 199 mg/dL (2022 02:39)  POCT Blood Glucose.: 153 mg/dL (2022 00:23)  POCT Blood Glucose.: 170 mg/dL (2022 22:01)  POCT Blood Glucose.: 202 mg/dL (2022 20:43)  POCT Blood Glucose.: 181 mg/dL (2022 18:44)  POCT Blood Glucose.: 178 mg/dL (2022 17:55)  POCT Blood Glucose.: 193 mg/dL (2022 16:43)  POCT Blood Glucose.: 200 mg/dL (2022 15:56)  POCT Blood Glucose.: 209 mg/dL (2022 14:48)  POCT Blood Glucose.: 229 mg/dL (2022 13:58)  POCT Blood Glucose.: 207 mg/dL (2022 12:40)  POCT Blood Glucose.: 204 mg/dL (2022 11:42)  POCT Blood Glucose.: 205 mg/dL (2022 11:03)            Drains:     MS         [  ] Drainage:                 L Pleural  [  ]  Drainage:                R Pleural  [  ]  Drainage:    Pacing Wires        [  ]   Settings:                                  Isolated  [  ]    Coumadin    [ ] YES          [  ]      NO                                   PHYSICAL EXAM        Neurology: alert and oriented x 3, nonfocal, no gross deficits  CV : s1 s2 RRR  Sternal Wound :  CDI , Stable  Lungs: cta  Abdomen: soft, nontender, nondistended, positive bowel sounds, last bowel movement                       chest tubes  :    voiding / montgomery - sbd         Extremities:      edema   /  -   calve tenderness ,    L leg  /  R leg  incisions cdi          acetaminophen     Tablet .. 650 milliGRAM(s) Oral every 6 hours  aMIOdarone    Tablet 400 milliGRAM(s) Oral two times a day  ascorbic acid 500 milliGRAM(s) Oral two times a day  aspirin enteric coated 81 milliGRAM(s) Oral daily  bisacodyl Suppository 10 milliGRAM(s) Rectal once  chlorhexidine 2% Cloths 1 Application(s) Topical daily  dextrose 40% Gel 15 Gram(s) Oral once  dextrose 50% Injectable 25 Gram(s) IV Push once  dextrose 50% Injectable 12.5 Gram(s) IV Push once  dextrose 50% Injectable 25 Gram(s) IV Push once  enoxaparin Injectable 40 milliGRAM(s) SubCutaneous daily  fenofibrate Tablet 145 milliGRAM(s) Oral daily  gabapentin 100 milliGRAM(s) Oral every 8 hours  glucagon  Injectable 1 milliGRAM(s) IntraMuscular once  HYDROmorphone  Injectable 0.5 milliGRAM(s) IV Push every 6 hours PRN  insulin glargine Injectable (LANTUS) 50 Unit(s) SubCutaneous at bedtime  insulin lispro (ADMELOG) corrective regimen sliding scale   SubCutaneous three times a day before meals  insulin lispro (ADMELOG) corrective regimen sliding scale   SubCutaneous at bedtime  insulin lispro (ADMELOG) corrective regimen sliding scale   SubCutaneous three times a day before meals  insulin lispro (ADMELOG) corrective regimen sliding scale   SubCutaneous at bedtime  insulin lispro Injectable (ADMELOG) 12 Unit(s) SubCutaneous three times a day before meals  metoprolol tartrate 25 milliGRAM(s) Oral every 8 hours  oxyCODONE    IR 5 milliGRAM(s) Oral every 4 hours PRN  oxyCODONE    IR 10 milliGRAM(s) Oral every 4 hours PRN  polyethylene glycol 3350 17 Gram(s) Oral daily  polyethylene glycol 3350 17 Gram(s) Oral daily  senna 2 Tablet(s) Oral at bedtime  simvastatin 40 milliGRAM(s) Oral at bedtime  sodium chloride 0.9%. 1000 milliLiter(s) IV Continuous <Continuous>                    Physical Therapy Rec:   Home  [  ]   Home w/ PT  [  ]  Rehab  [  ]  Discussed with Cardiothoracic Team at AM rounds.

## 2022-02-18 NOTE — CONSULT NOTE ADULT - ASSESSMENT
Assessment  DMT2: 61y Male with DM T2 with hyperglycemia, A1C 8.4%, was on oral meds and insulin at home, postop received basal bolus insulin as well as IV insulin, blood sugars fluctuating/running high and not at postop target, no hypoglycemic episodes, eating full meals, NAD.  CAD: s/p CABG, on medications, no chest pain, stable, monitored.  HTN: On meds, monitored.  Obesity: No strict exercise routines, not on any weight loss program, neither on low calorie diet.        Harry Rose MD  Cell: [phone]  Office: [phone]       Assessment  DMT2: 61y Male with DM T2 with hyperglycemia, A1C 8.4%, was on oral meds and insulin at home, postop received basal bolus insulin as  well as IV insulin, blood sugars fluctuating/running high and not at postop target, no hypoglycemic episodes, eating full meals, NAD.  CAD: s/p CABG, on medications, no chest pain, stable, monitored.  HTN: On meds, monitored.  Obesity: No strict exercise routines, not on any weight loss program, neither on low calorie diet.        Harry Rose MD  Cell: [phone]  Office: [phone]

## 2022-02-18 NOTE — CONSULT NOTE ADULT - PROBLEM SELECTOR RECOMMENDATION 9
Will increase Lantus to 50u at bedtime.  Will increase Admelog to 12u before each meal and continue Admelog correction scale coverage. Will continue monitoring FS and FU.  Patient will likely be DC on basal bolus insulin regimen, will continue monitoring and FU.  Patient counseled for compliance with consistent low carb diet and exercise as tolerated outpatient.

## 2022-02-19 LAB
ALBUMIN SERPL ELPH-MCNC: 3.7 G/DL — SIGNIFICANT CHANGE UP (ref 3.3–5)
ALP SERPL-CCNC: 33 U/L — LOW (ref 40–120)
ALT FLD-CCNC: 18 U/L — SIGNIFICANT CHANGE UP (ref 10–45)
ANION GAP SERPL CALC-SCNC: 12 MMOL/L — SIGNIFICANT CHANGE UP (ref 5–17)
AST SERPL-CCNC: 21 U/L — SIGNIFICANT CHANGE UP (ref 10–40)
BASOPHILS # BLD AUTO: 0.02 K/UL — SIGNIFICANT CHANGE UP (ref 0–0.2)
BASOPHILS NFR BLD AUTO: 0.2 % — SIGNIFICANT CHANGE UP (ref 0–2)
BILIRUB SERPL-MCNC: 0.6 MG/DL — SIGNIFICANT CHANGE UP (ref 0.2–1.2)
BUN SERPL-MCNC: 17 MG/DL — SIGNIFICANT CHANGE UP (ref 7–23)
CALCIUM SERPL-MCNC: 9.2 MG/DL — SIGNIFICANT CHANGE UP (ref 8.4–10.5)
CHLORIDE SERPL-SCNC: 104 MMOL/L — SIGNIFICANT CHANGE UP (ref 96–108)
CO2 SERPL-SCNC: 24 MMOL/L — SIGNIFICANT CHANGE UP (ref 22–31)
CREAT SERPL-MCNC: 0.83 MG/DL — SIGNIFICANT CHANGE UP (ref 0.5–1.3)
EOSINOPHIL # BLD AUTO: 0.03 K/UL — SIGNIFICANT CHANGE UP (ref 0–0.5)
EOSINOPHIL NFR BLD AUTO: 0.3 % — SIGNIFICANT CHANGE UP (ref 0–6)
GLUCOSE BLDC GLUCOMTR-MCNC: 141 MG/DL — HIGH (ref 70–99)
GLUCOSE BLDC GLUCOMTR-MCNC: 154 MG/DL — HIGH (ref 70–99)
GLUCOSE BLDC GLUCOMTR-MCNC: 163 MG/DL — HIGH (ref 70–99)
GLUCOSE BLDC GLUCOMTR-MCNC: 188 MG/DL — HIGH (ref 70–99)
GLUCOSE SERPL-MCNC: 153 MG/DL — HIGH (ref 70–99)
HCT VFR BLD CALC: 34.2 % — LOW (ref 39–50)
HGB BLD-MCNC: 11.4 G/DL — LOW (ref 13–17)
IMM GRANULOCYTES NFR BLD AUTO: 0.5 % — SIGNIFICANT CHANGE UP (ref 0–1.5)
LYMPHOCYTES # BLD AUTO: 1.23 K/UL — SIGNIFICANT CHANGE UP (ref 1–3.3)
LYMPHOCYTES # BLD AUTO: 12.5 % — LOW (ref 13–44)
MCHC RBC-ENTMCNC: 29.3 PG — SIGNIFICANT CHANGE UP (ref 27–34)
MCHC RBC-ENTMCNC: 33.3 GM/DL — SIGNIFICANT CHANGE UP (ref 32–36)
MCV RBC AUTO: 87.9 FL — SIGNIFICANT CHANGE UP (ref 80–100)
MONOCYTES # BLD AUTO: 1.1 K/UL — HIGH (ref 0–0.9)
MONOCYTES NFR BLD AUTO: 11.2 % — SIGNIFICANT CHANGE UP (ref 2–14)
NEUTROPHILS # BLD AUTO: 7.43 K/UL — HIGH (ref 1.8–7.4)
NEUTROPHILS NFR BLD AUTO: 75.3 % — SIGNIFICANT CHANGE UP (ref 43–77)
NRBC # BLD: 0 /100 WBCS — SIGNIFICANT CHANGE UP (ref 0–0)
PLATELET # BLD AUTO: 127 K/UL — LOW (ref 150–400)
POTASSIUM SERPL-MCNC: 4.1 MMOL/L — SIGNIFICANT CHANGE UP (ref 3.5–5.3)
POTASSIUM SERPL-SCNC: 4.1 MMOL/L — SIGNIFICANT CHANGE UP (ref 3.5–5.3)
PROT SERPL-MCNC: 6 G/DL — SIGNIFICANT CHANGE UP (ref 6–8.3)
RBC # BLD: 3.89 M/UL — LOW (ref 4.2–5.8)
RBC # FLD: 14.6 % — HIGH (ref 10.3–14.5)
SODIUM SERPL-SCNC: 140 MMOL/L — SIGNIFICANT CHANGE UP (ref 135–145)
WBC # BLD: 9.86 K/UL — SIGNIFICANT CHANGE UP (ref 3.8–10.5)
WBC # FLD AUTO: 9.86 K/UL — SIGNIFICANT CHANGE UP (ref 3.8–10.5)

## 2022-02-19 PROCEDURE — 71045 X-RAY EXAM CHEST 1 VIEW: CPT | Mod: 26

## 2022-02-19 RX ORDER — INSULIN LISPRO 100/ML
10 VIAL (ML) SUBCUTANEOUS
Refills: 0 | Status: DISCONTINUED | OUTPATIENT
Start: 2022-02-19 | End: 2022-02-20

## 2022-02-19 RX ORDER — METOPROLOL TARTRATE 50 MG
50 TABLET ORAL DAILY
Refills: 0 | Status: DISCONTINUED | OUTPATIENT
Start: 2022-02-19 | End: 2022-02-20

## 2022-02-19 RX ORDER — INSULIN GLARGINE 100 [IU]/ML
48 INJECTION, SOLUTION SUBCUTANEOUS AT BEDTIME
Refills: 0 | Status: DISCONTINUED | OUTPATIENT
Start: 2022-02-19 | End: 2022-02-20

## 2022-02-19 RX ADMIN — Medication 145 MILLIGRAM(S): at 11:34

## 2022-02-19 RX ADMIN — Medication 25 MILLIGRAM(S): at 05:26

## 2022-02-19 RX ADMIN — GABAPENTIN 100 MILLIGRAM(S): 400 CAPSULE ORAL at 13:18

## 2022-02-19 RX ADMIN — AMIODARONE HYDROCHLORIDE 400 MILLIGRAM(S): 400 TABLET ORAL at 05:25

## 2022-02-19 RX ADMIN — Medication 10 UNIT(S): at 12:10

## 2022-02-19 RX ADMIN — Medication 81 MILLIGRAM(S): at 11:34

## 2022-02-19 RX ADMIN — Medication 650 MILLIGRAM(S): at 05:48

## 2022-02-19 RX ADMIN — Medication 50 MILLIGRAM(S): at 13:18

## 2022-02-19 RX ADMIN — SIMVASTATIN 40 MILLIGRAM(S): 20 TABLET, FILM COATED ORAL at 21:35

## 2022-02-19 RX ADMIN — Medication 1: at 08:12

## 2022-02-19 RX ADMIN — GABAPENTIN 100 MILLIGRAM(S): 400 CAPSULE ORAL at 05:26

## 2022-02-19 RX ADMIN — Medication 650 MILLIGRAM(S): at 11:34

## 2022-02-19 RX ADMIN — Medication 650 MILLIGRAM(S): at 05:25

## 2022-02-19 RX ADMIN — ENOXAPARIN SODIUM 40 MILLIGRAM(S): 100 INJECTION SUBCUTANEOUS at 11:35

## 2022-02-19 RX ADMIN — GABAPENTIN 100 MILLIGRAM(S): 400 CAPSULE ORAL at 21:35

## 2022-02-19 RX ADMIN — Medication 650 MILLIGRAM(S): at 12:04

## 2022-02-19 RX ADMIN — CHLORHEXIDINE GLUCONATE 1 APPLICATION(S): 213 SOLUTION TOPICAL at 21:48

## 2022-02-19 RX ADMIN — INSULIN GLARGINE 48 UNIT(S): 100 INJECTION, SOLUTION SUBCUTANEOUS at 21:35

## 2022-02-19 RX ADMIN — POLYETHYLENE GLYCOL 3350 17 GRAM(S): 17 POWDER, FOR SOLUTION ORAL at 11:35

## 2022-02-19 RX ADMIN — Medication 12 UNIT(S): at 08:12

## 2022-02-19 RX ADMIN — Medication 1: at 17:06

## 2022-02-19 RX ADMIN — Medication 1: at 12:10

## 2022-02-19 RX ADMIN — Medication 500 MILLIGRAM(S): at 05:25

## 2022-02-19 RX ADMIN — Medication 500 MILLIGRAM(S): at 17:05

## 2022-02-19 RX ADMIN — Medication 10 UNIT(S): at 17:06

## 2022-02-19 NOTE — PROGRESS NOTE ADULT - PROBLEM SELECTOR PLAN 1
Will decrease Lantus to 48 units at bed time.  Will decrease Admelog to 10 units before each meal in addition to Admelog correction scale coverage.  Patient counseled for compliance with consistent low carb diet.

## 2022-02-19 NOTE — PROGRESS NOTE ADULT - ASSESSMENT
60 yo male presents to PST prior to scheduled CABG x 3 on 2/16/22 with Dr. Moi Chambers. Pmhx includes HTN, HLD, CAD, DM, ERNESTINE precautions, GERD, history of DVT in LLE (post op ankle surgery in 2019, was on Eliquis x 6 months and then cleared). Reports he feels "fatigued" and ALEJO after working; over the past few months. He denies chest pain, palpitations, SOB at rest, dizziness, syncope. Had a cardiac angiogram on 12/30/2021 which revealed " LT main 10 to 30% stenosis, proximal, proximal to mid LAD 80% stenosis, ostial 1st diagonal 50% stenosis, ostial and proximal circumflex 50% stenosis, proximal OM3 100% stenosis, proximal % stenosis, proximal to mid RCA 50% stenosis, distal RCA 70% stenosis, mid RPDA 70% stenosis." He was recently evaluated by Dr. Chambers and above surgery was recommended.  2/16/22/p CABGx3 (LIMA-LAD, SVG-Diag, SVG-LPL) ef 60  No blood products, patient received one unit of their own blood back at end of case  Extubated d 0  Insulin infusion  2/17 transferred to sdu  2/18 POD #2, Med and LPT removed, R radial a-line removed, Intro removed, wean insulin gtt   62 yo male presents to PST prior to scheduled CABG x 3 on 2/16/22 with Dr. Moi Maya. Pmhx includes HTN, HLD, CAD, DM, ERNESTINE precautions, GERD, history of DVT in LLE (post op ankle surgery in 2019, was on Eliquis x 6 months and then cleared). Reports he feels "fatigued" and ALEJO after working; over the past few months. He denies chest pain, palpitations, SOB at rest, dizziness, syncope. Had a cardiac angiogram on 12/30/2021 which revealed " LT main 10 to 30% stenosis, proximal, proximal to mid LAD 80% stenosis, ostial 1st diagonal 50% stenosis, ostial and proximal circumflex 50% stenosis, proximal OM3 100% stenosis, proximal % stenosis, proximal to mid RCA 50% stenosis, distal RCA 70% stenosis, mid RPDA 70% stenosis." He was recently evaluated by Dr. Maya and above surgery was recommended.  2/16/22/p CABGx3 (LIMA-LAD, SVG-Diag, SVG-LPL) ef 60  No blood products, patient received one unit of their own blood back at end of case  Extubated d 0  Insulin infusion  2/17 transferred to sdu  2/18 POD #2, Med and LPT removed, R radial a-line removed, Intro removed, wean insulin gtt  2/19 VSS;  RSR 60-70; d/c pw; change lopressor to toprol as per DR. maya; insulin teaching initiated  Discharge planning- home in am sun

## 2022-02-19 NOTE — PROGRESS NOTE ADULT - PROBLEM SELECTOR PLAN 2
Monitor FS hourly  Continue insulin gtt  Endocrine Dr. Rose for weaning endo following   continue lantus and admelog as per endo   diabetic diet  accuchecks ac and hs

## 2022-02-19 NOTE — PROGRESS NOTE ADULT - PROBLEM SELECTOR PLAN 1
C3L with Dr. Chambers on 2/16  ASA 81, Simvastatin 40mg, Lopressor 25 TID  Chest PT,  Incentive spirometry, pain management  Increase ambulation  ERP protocol  Monitor I&Os continue postop care  continue asa/ statin and change lop to toprol 50 qd  d/c pw  pulm toilet  pain management  increase activity as tolerated  Discharge planning- home in am with insulin as per endo

## 2022-02-19 NOTE — PROGRESS NOTE ADULT - SUBJECTIVE AND OBJECTIVE BOX
Chief complaint    Patient is a 61y old  Male who presents with a chief complaint of s/p CABG (18 Feb 2022 09:34)   Review of systems  Patient in bed, appears comfortable.    Labs and Fingersticks  CAPILLARY BLOOD GLUCOSE      POCT Blood Glucose.: 163 mg/dL (19 Feb 2022 07:46)  POCT Blood Glucose.: 163 mg/dL (18 Feb 2022 21:19)  POCT Blood Glucose.: 181 mg/dL (18 Feb 2022 17:28)  POCT Blood Glucose.: 160 mg/dL (18 Feb 2022 12:04)      Anion Gap, Serum: 12 (02-19 @ 05:13)  Anion Gap, Serum: 10 (02-18 @ 06:28)      Calcium, Total Serum: 9.2 (02-19 @ 05:13)  Calcium, Total Serum: 8.9 (02-18 @ 06:28)  Albumin, Serum: 3.7 (02-19 @ 05:13)  Albumin, Serum: 3.8 (02-18 @ 06:28)    Alanine Aminotransferase (ALT/SGPT): 18 (02-19 @ 05:13)  Alanine Aminotransferase (ALT/SGPT): 17 (02-18 @ 06:28)  Alkaline Phosphatase, Serum: 33 *L* (02-19 @ 05:13)  Alkaline Phosphatase, Serum: 29 *L* (02-18 @ 06:28)  Aspartate Aminotransferase (AST/SGOT): 21 (02-19 @ 05:13)  Aspartate Aminotransferase (AST/SGOT): 23 (02-18 @ 06:28)        02-19    140  |  104  |  17  ----------------------------<  153<H>  4.1   |  24  |  0.83    Ca    9.2      19 Feb 2022 05:13  Phos  2.1     02-18  Mg     2.2     02-18    TPro  6.0  /  Alb  3.7  /  TBili  0.6  /  DBili  x   /  AST  21  /  ALT  18  /  AlkPhos  33<L>  02-19                        11.4   9.86  )-----------( 127      ( 19 Feb 2022 05:13 )             34.2     Medications  MEDICATIONS  (STANDING):  acetaminophen     Tablet .. 650 milliGRAM(s) Oral every 6 hours  ascorbic acid 500 milliGRAM(s) Oral two times a day  aspirin enteric coated 81 milliGRAM(s) Oral daily  bisacodyl Suppository 10 milliGRAM(s) Rectal once  chlorhexidine 2% Cloths 1 Application(s) Topical daily  dextrose 40% Gel 15 Gram(s) Oral once  dextrose 50% Injectable 25 Gram(s) IV Push once  dextrose 50% Injectable 12.5 Gram(s) IV Push once  dextrose 50% Injectable 25 Gram(s) IV Push once  enoxaparin Injectable 40 milliGRAM(s) SubCutaneous daily  fenofibrate Tablet 145 milliGRAM(s) Oral daily  gabapentin 100 milliGRAM(s) Oral every 8 hours  glucagon  Injectable 1 milliGRAM(s) IntraMuscular once  insulin glargine Injectable (LANTUS) 50 Unit(s) SubCutaneous at bedtime  insulin lispro (ADMELOG) corrective regimen sliding scale   SubCutaneous three times a day before meals  insulin lispro (ADMELOG) corrective regimen sliding scale   SubCutaneous at bedtime  insulin lispro (ADMELOG) corrective regimen sliding scale   SubCutaneous three times a day before meals  insulin lispro (ADMELOG) corrective regimen sliding scale   SubCutaneous at bedtime  insulin lispro Injectable (ADMELOG) 12 Unit(s) SubCutaneous three times a day before meals  metoprolol succinate ER 50 milliGRAM(s) Oral daily  polyethylene glycol 3350 17 Gram(s) Oral daily  polyethylene glycol 3350 17 Gram(s) Oral daily  senna 2 Tablet(s) Oral at bedtime  simvastatin 40 milliGRAM(s) Oral at bedtime  sodium chloride 0.9%. 1000 milliLiter(s) (10 mL/Hr) IV Continuous <Continuous>      Physical Exam  General: Patient comfortable in bed  Vital Signs Last 12 Hrs  T(F): 98.4 (02-19-22 @ 05:00), Max: 98.4 (02-19-22 @ 05:00)  HR: 72 (02-19-22 @ 05:00) (72 - 72)  BP: 103/68 (02-19-22 @ 05:00) (103/68 - 103/68)  BP(mean): 80 (02-19-22 @ 05:00) (80 - 80)  RR: 18 (02-19-22 @ 05:00) (18 - 18)  SpO2: 92% (02-19-22 @ 05:00) (92% - 92%)  Neck: No palpable thyroid nodules.  CVS: S1S2, No murmurs  Respiratory: No wheezing, no crepitations  GI: Abdomen soft, bowel sounds positive  Musculoskeletal:  edema lower extremities.     Diagnostics

## 2022-02-19 NOTE — PROGRESS NOTE ADULT - ASSESSMENT
Assessment  DMT2: 61y Male with DM T2 with hyperglycemia, A1C 8.4%, was on oral meds and insulin at home, started on basal bolus insulin, blood sugars are trending down, no hypoglycemic episodes, eating full meals, NAD.  CAD: s/p CABG, on medications, no chest pain, stable, monitored.  HTN: On meds, monitored.  Obesity: No strict exercise routines, not on any weight loss program, neither on low calorie diet.        Harry Rose MD  Cell: [phone]  Office: [phone]

## 2022-02-20 ENCOUNTER — TRANSCRIPTION ENCOUNTER (OUTPATIENT)
Age: 62
End: 2022-02-20

## 2022-02-20 VITALS — WEIGHT: 221.56 LBS

## 2022-02-20 LAB
ANION GAP SERPL CALC-SCNC: 11 MMOL/L — SIGNIFICANT CHANGE UP (ref 5–17)
BUN SERPL-MCNC: 14 MG/DL — SIGNIFICANT CHANGE UP (ref 7–23)
CALCIUM SERPL-MCNC: 9.2 MG/DL — SIGNIFICANT CHANGE UP (ref 8.4–10.5)
CHLORIDE SERPL-SCNC: 105 MMOL/L — SIGNIFICANT CHANGE UP (ref 96–108)
CO2 SERPL-SCNC: 25 MMOL/L — SIGNIFICANT CHANGE UP (ref 22–31)
CREAT SERPL-MCNC: 0.83 MG/DL — SIGNIFICANT CHANGE UP (ref 0.5–1.3)
GLUCOSE BLDC GLUCOMTR-MCNC: 140 MG/DL — HIGH (ref 70–99)
GLUCOSE BLDC GLUCOMTR-MCNC: 169 MG/DL — HIGH (ref 70–99)
GLUCOSE SERPL-MCNC: 124 MG/DL — HIGH (ref 70–99)
HCT VFR BLD CALC: 35.1 % — LOW (ref 39–50)
HGB BLD-MCNC: 11.5 G/DL — LOW (ref 13–17)
MCHC RBC-ENTMCNC: 28.2 PG — SIGNIFICANT CHANGE UP (ref 27–34)
MCHC RBC-ENTMCNC: 32.8 GM/DL — SIGNIFICANT CHANGE UP (ref 32–36)
MCV RBC AUTO: 86 FL — SIGNIFICANT CHANGE UP (ref 80–100)
NRBC # BLD: 0 /100 WBCS — SIGNIFICANT CHANGE UP (ref 0–0)
PLATELET # BLD AUTO: 152 K/UL — SIGNIFICANT CHANGE UP (ref 150–400)
POTASSIUM SERPL-MCNC: 3.9 MMOL/L — SIGNIFICANT CHANGE UP (ref 3.5–5.3)
POTASSIUM SERPL-SCNC: 3.9 MMOL/L — SIGNIFICANT CHANGE UP (ref 3.5–5.3)
RBC # BLD: 4.08 M/UL — LOW (ref 4.2–5.8)
RBC # FLD: 13.9 % — SIGNIFICANT CHANGE UP (ref 10.3–14.5)
SODIUM SERPL-SCNC: 141 MMOL/L — SIGNIFICANT CHANGE UP (ref 135–145)
WBC # BLD: 7.19 K/UL — SIGNIFICANT CHANGE UP (ref 3.8–10.5)
WBC # FLD AUTO: 7.19 K/UL — SIGNIFICANT CHANGE UP (ref 3.8–10.5)

## 2022-02-20 PROCEDURE — 82803 BLOOD GASES ANY COMBINATION: CPT

## 2022-02-20 PROCEDURE — 85610 PROTHROMBIN TIME: CPT

## 2022-02-20 PROCEDURE — 85027 COMPLETE CBC AUTOMATED: CPT

## 2022-02-20 PROCEDURE — 82947 ASSAY GLUCOSE BLOOD QUANT: CPT

## 2022-02-20 PROCEDURE — 82962 GLUCOSE BLOOD TEST: CPT

## 2022-02-20 PROCEDURE — 84132 ASSAY OF SERUM POTASSIUM: CPT

## 2022-02-20 PROCEDURE — 85014 HEMATOCRIT: CPT

## 2022-02-20 PROCEDURE — 83735 ASSAY OF MAGNESIUM: CPT

## 2022-02-20 PROCEDURE — 80048 BASIC METABOLIC PNL TOTAL CA: CPT

## 2022-02-20 PROCEDURE — 85730 THROMBOPLASTIN TIME PARTIAL: CPT

## 2022-02-20 PROCEDURE — 86891 AUTOLOGOUS BLOOD OP SALVAGE: CPT

## 2022-02-20 PROCEDURE — 86923 COMPATIBILITY TEST ELECTRIC: CPT

## 2022-02-20 PROCEDURE — C1889: CPT

## 2022-02-20 PROCEDURE — P9047: CPT

## 2022-02-20 PROCEDURE — C1769: CPT

## 2022-02-20 PROCEDURE — 80053 COMPREHEN METABOLIC PANEL: CPT

## 2022-02-20 PROCEDURE — C1729: CPT

## 2022-02-20 PROCEDURE — 82550 ASSAY OF CK (CPK): CPT

## 2022-02-20 PROCEDURE — 82565 ASSAY OF CREATININE: CPT

## 2022-02-20 PROCEDURE — 82330 ASSAY OF CALCIUM: CPT

## 2022-02-20 PROCEDURE — 84295 ASSAY OF SERUM SODIUM: CPT

## 2022-02-20 PROCEDURE — 84100 ASSAY OF PHOSPHORUS: CPT

## 2022-02-20 PROCEDURE — 83605 ASSAY OF LACTIC ACID: CPT

## 2022-02-20 PROCEDURE — 82435 ASSAY OF BLOOD CHLORIDE: CPT

## 2022-02-20 PROCEDURE — P9041: CPT

## 2022-02-20 PROCEDURE — 94002 VENT MGMT INPAT INIT DAY: CPT

## 2022-02-20 PROCEDURE — 85025 COMPLETE CBC W/AUTO DIFF WBC: CPT

## 2022-02-20 PROCEDURE — 85018 HEMOGLOBIN: CPT

## 2022-02-20 PROCEDURE — 85384 FIBRINOGEN ACTIVITY: CPT

## 2022-02-20 PROCEDURE — 82553 CREATINE MB FRACTION: CPT

## 2022-02-20 PROCEDURE — 93005 ELECTROCARDIOGRAM TRACING: CPT

## 2022-02-20 PROCEDURE — 84484 ASSAY OF TROPONIN QUANT: CPT

## 2022-02-20 PROCEDURE — 71045 X-RAY EXAM CHEST 1 VIEW: CPT

## 2022-02-20 PROCEDURE — C9399: CPT

## 2022-02-20 PROCEDURE — P9045: CPT

## 2022-02-20 PROCEDURE — 97162 PT EVAL MOD COMPLEX 30 MIN: CPT

## 2022-02-20 PROCEDURE — C1751: CPT

## 2022-02-20 RX ORDER — ROSUVASTATIN CALCIUM 5 MG/1
1 TABLET ORAL
Qty: 0 | Refills: 0 | DISCHARGE

## 2022-02-20 RX ORDER — OXYCODONE HYDROCHLORIDE 5 MG/1
1 TABLET ORAL
Qty: 36 | Refills: 0
Start: 2022-02-20 | End: 2022-02-25

## 2022-02-20 RX ORDER — ICOSAPENT ETHYL 500 MG/1
2 CAPSULE, LIQUID FILLED ORAL
Qty: 0 | Refills: 0 | DISCHARGE

## 2022-02-20 RX ORDER — EZETIMIBE 10 MG/1
10 TABLET ORAL
Qty: 0 | Refills: 0 | DISCHARGE

## 2022-02-20 RX ORDER — ASPIRIN/CALCIUM CARB/MAGNESIUM 324 MG
1 TABLET ORAL
Qty: 0 | Refills: 0 | DISCHARGE

## 2022-02-20 RX ORDER — METOPROLOL TARTRATE 50 MG
1 TABLET ORAL
Qty: 0 | Refills: 0 | DISCHARGE

## 2022-02-20 RX ORDER — ESOMEPRAZOLE MAGNESIUM 40 MG/1
1 CAPSULE, DELAYED RELEASE ORAL
Qty: 0 | Refills: 0 | DISCHARGE

## 2022-02-20 RX ORDER — LOSARTAN POTASSIUM 100 MG/1
1 TABLET, FILM COATED ORAL
Qty: 0 | Refills: 0 | DISCHARGE

## 2022-02-20 RX ORDER — SEMAGLUTIDE 0.68 MG/ML
1 INJECTION, SOLUTION SUBCUTANEOUS
Qty: 0 | Refills: 0 | DISCHARGE

## 2022-02-20 RX ORDER — METOPROLOL TARTRATE 50 MG
1 TABLET ORAL
Qty: 30 | Refills: 1
Start: 2022-02-20 | End: 2022-04-20

## 2022-02-20 RX ORDER — ACETAMINOPHEN 500 MG
0 TABLET ORAL
Qty: 0 | Refills: 0 | DISCHARGE
Start: 2022-02-20

## 2022-02-20 RX ORDER — NITROGLYCERIN 6.5 MG
1 CAPSULE, EXTENDED RELEASE ORAL
Qty: 0 | Refills: 0 | DISCHARGE

## 2022-02-20 RX ORDER — METFORMIN HYDROCHLORIDE 850 MG/1
2 TABLET ORAL
Qty: 0 | Refills: 0 | DISCHARGE

## 2022-02-20 RX ORDER — FENOFIBRATE,MICRONIZED 130 MG
1 CAPSULE ORAL
Qty: 0 | Refills: 0 | DISCHARGE

## 2022-02-20 RX ORDER — DAPAGLIFLOZIN 10 MG/1
1 TABLET, FILM COATED ORAL
Qty: 0 | Refills: 0 | DISCHARGE

## 2022-02-20 RX ORDER — SENNA PLUS 8.6 MG/1
2 TABLET ORAL
Qty: 0 | Refills: 0 | DISCHARGE
Start: 2022-02-20

## 2022-02-20 RX ADMIN — Medication 81 MILLIGRAM(S): at 08:23

## 2022-02-20 RX ADMIN — Medication 50 MILLIGRAM(S): at 06:12

## 2022-02-20 RX ADMIN — Medication 1: at 12:09

## 2022-02-20 RX ADMIN — GABAPENTIN 100 MILLIGRAM(S): 400 CAPSULE ORAL at 06:13

## 2022-02-20 RX ADMIN — Medication 145 MILLIGRAM(S): at 08:23

## 2022-02-20 RX ADMIN — Medication 500 MILLIGRAM(S): at 06:12

## 2022-02-20 RX ADMIN — ENOXAPARIN SODIUM 40 MILLIGRAM(S): 100 INJECTION SUBCUTANEOUS at 12:11

## 2022-02-20 RX ADMIN — Medication 10 UNIT(S): at 12:11

## 2022-02-20 RX ADMIN — Medication 10 UNIT(S): at 08:23

## 2022-02-20 NOTE — DISCHARGE NOTE NURSING/CASE MANAGEMENT/SOCIAL WORK - NSDCPEFALRISK_GEN_ALL_CORE
For information on Fall & Injury Prevention, visit: [email or web address] OR  [email or web address] OR  https://www.cdc.gov/steadi/patient.html

## 2022-02-20 NOTE — PROGRESS NOTE ADULT - PROBLEM SELECTOR PLAN 1
Will continue current insulin regimen for now. Will continue monitoring FS, log, and FU.  Patient follows closely with Endo and wants to continue his prior home meds, Suggest DC on the following DM regimen:  -Adjust Toujeo to be 48u at bedtime  -Continue prior PO meds  -Has an appointment to FU with his Endo 2/28  Discussed plan with patient and wife at bedside. Counseled on adjusting insulin dose based on blood sugars, compliance with consistent low carb diet and exercise as tolerated outpatient, close Endo FU.

## 2022-02-20 NOTE — DISCHARGE NOTE PROVIDER - NSDCFUADDINST_GEN_ALL_CORE_FT
Wash incisions with soap and water using washcloth in shower daily,do not apply any lotion,powder,oil,sunscreen to any surgical incision  Please come to ED or Call Cardio thoracic office at [phone] if Chest pain, Shortness of Breath, persistant Nausea & vomiting, oozing from wounds,palpitations or pain not relieved with medication  Weigh yourself daily>notify surgeons office if  2 lb increase in weight in 24 hours.  1. Take ALL of your medications as ordered. Fill your prescriptions the day you are discharged and take according to the schedule you were given. Continue to take a stool softener if you are taking narcotic pain medications. AVOID medications such as ibuprofen or naproxen if you have had bypass surgery. If you have any questions or are unable to fill the prescriptions, please call the office right away at [phone].  2. Shower daily. Clean all incisions daily while showering with warm water and mild soap, pat dry with a clean towel and do not cover with any dressings unless instructed to. No bathing, swimming in a pool or the ocean until instructed by MD.  DO NOT use creams or lotions on the wound.  3. We advise that you do not drive until instructed by MD. Use your red pillow between chest and seatbelt to avoid injury in the event of a motor vehicle accident until you see the surgeon again in the office.  4. You may not return to work until instructed by MD.   5. Please eat a low fat, low cholesterol, low salt diet. (No added/extra salt). A nutritional supplement like Ensure or Glucerna (for diabetics) may help you reach your nutritional goals after surgery and aid in your recovery.  6. Weigh yourself every day in the morning and record it in the weight log in your red folder. Notify the office of any weight gain more than 2-3 pounds in 24 hours.  **Please LIMIT YOUR FLUID INTAKE TO ABOUT 4 8 OUNCE GLASSES OF BEVERAGE DAILY.**  7. Continue breathing exercises several times a day. Continue to use your heart pillow when coughing.  8. No heavy lifting nothing greater than 5 pounds until cleared by MD. We recommend that you sleep on your back and not your side or stomach for the next 4-6 weeks.  9. Call / Notify MD any fever greater than 101.0, any drainage from incisions or if they become red, hot or very tender to the touch.  10. Increase activity as tolerated. Walk indoors and/or outdoors at least 3 times a day.

## 2022-02-20 NOTE — DISCHARGE NOTE NURSING/CASE MANAGEMENT/SOCIAL WORK - NSSCNAMETXT_GEN_ALL_CORE
[facility], [phone].  Visiting nurse to call & arrange home care appointment for day after hospital discharge.

## 2022-02-20 NOTE — DISCHARGE NOTE PROVIDER - NSDCMRMEDTOKEN_GEN_ALL_CORE_FT
acetaminophen: every 8 hours, As Needed  aspirin 81 mg oral delayed release tablet: 1 tab(s) orally once a day  ezetimibe 10 mg oral tablet: 10 milligram(s) orally once a day  Farxiga 10 mg oral tablet: 1 tab(s) orally once a day  metFORMIN 500 mg oral tablet: 2 tab(s) orally 2 times a day  metoprolol succinate 50 mg oral tablet, extended release: 1 tab(s) orally once a day  NexIUM 20 mg oral delayed release capsule: 1 cap(s) orally once a day  oxyCODONE 5 mg oral tablet: 1 tab(s) orally every 4 hours, As needed, Moderate Pain (4 - 6) MDD:4 tabs  rosuvastatin 40 mg oral tablet: 1 tab(s) orally once a day  Rybelsus 14 mg oral tablet: 1 tab(s) orally once a day  senna oral tablet: 2 tab(s) orally once a day (at bedtime)  Therapeutic Multiple Vitamins oral capsule: 1 cap(s) orally once a day  Toumanisha SoloStar 300 units/mL subcutaneous solution: 12 unit(s) subcutaneous once a day (at bedtime)

## 2022-02-20 NOTE — DISCHARGE NOTE NURSING/CASE MANAGEMENT/SOCIAL WORK - PATIENT PORTAL LINK FT
You can access the FollowMyHealth Patient Portal offered by [facility] by registering at the following website: [email or web address]. By joining [facility]’s FollowMyHealth portal, you will also be able to view your health information using other applications (apps) compatible with our system.

## 2022-02-20 NOTE — DISCHARGE NOTE PROVIDER - NSDCCPCAREPLAN_GEN_ALL_CORE_FT
PRINCIPAL DISCHARGE DIAGNOSIS  Diagnosis: S/P CABG x 3  Assessment and Plan of Treatment: ASA atatin betablocker CAD  Glycemic control   Endocrine followup this week

## 2022-02-20 NOTE — DISCHARGE NOTE PROVIDER - HOSPITAL COURSE
60 yo male presents to PST prior to scheduled CABG x 3 on 2/16/22 with Dr. Moi Maya. Pmhx includes HTN, HLD, CAD, DM, ERNESTINE precautions, GERD, history of DVT in LLE (post op ankle surgery in 2019, was on Eliquis x 6 months and then cleared). Reports he feels "fatigued" and ALEJO after working; over the past few months. He denies chest pain, palpitations, SOB at rest, dizziness, syncope. Had a cardiac angiogram on 12/30/2021 which revealed " LT main 10 to 30% stenosis, proximal, proximal to mid LAD 80% stenosis, ostial 1st diagonal 50% stenosis, ostial and proximal circumflex 50% stenosis, proximal OM3 100% stenosis, proximal % stenosis, proximal to mid RCA 50% stenosis, distal RCA 70% stenosis, mid RPDA 70% stenosis." He was recently evaluated by Dr. Maya and above surgery was recommended.  2/16/22/p CABGx3 (LIMA-LAD, SVG-Diag, SVG-LPL) ef 60  No blood products, patient received one unit of their own blood back at end of case  Extubated d 0  Insulin infusion  2/17 transferred to sdu  2/18 POD #2, Med and LPT removed, R radial a-line removed, Intro removed, wean insulin gtt  2/19 VSS;  RSR 60-70; d/c pw; change lopressor to toprol as per DR. maya; insulin teaching initiated  2/20 DC home preadm DM regimen Endo followup   w/ private Endocrine this week

## 2022-02-20 NOTE — DISCHARGE NOTE PROVIDER - NSDCPNSUBOBJ_GEN_ALL_CORE
Subjective:  "Hello"  OOB chair  Wife at bedside, discharge instructions reviewed with pt, wife, questions answered      Tele:  SR  60s           V/S:                    T(F): 97.7 (22 @ 04:43), Max: 98.8 (22 @ 18:55)  HR: 70 (22 @ 04:43) (70 - 75)  BP: 119/75 (22 @ 04:43) (99/63 - 119/75)  RR: 18 (22 @ 04:43) (18 - 18)  SpO2: 94% (22 @ 04:43) (94% - 96%)  Wt(kg): --      LV EF:      Labs:      141  |  105  |  14  ----------------------------<  124<H>  3.9   |  25  |  0.83    Ca    9.2      2022 04:43    TPro  6.0  /  Alb  3.7  /  TBili  0.6  /  DBili  x   /  AST  21  /  ALT  18  /  AlkPhos  33<L>                                 11.5   7.19  )-----------( 152      ( 2022 04:43 )             35.1             CAPILLARY BLOOD GLUCOSE      POCT Blood Glucose.: 140 mg/dL (2022 07:58)  POCT Blood Glucose.: 141 mg/dL (2022 21:29)  POCT Blood Glucose.: 154 mg/dL (2022 16:35)  POCT Blood Glucose.: 188 mg/dL (2022 11:49)           CXR:    I&O's Detail    2022 07:01  -  2022 07:00  --------------------------------------------------------  IN:    Oral Fluid: 560 mL  Total IN: 560 mL    OUT:    Voided (mL): 950 mL  Total OUT: 950 mL    Total NET: -390 mL      2022 07:01  -  2022 11:35  --------------------------------------------------------  IN:    Oral Fluid: 240 mL  Total IN: 240 mL    OUT:  Total OUT: 0 mL    Total NET: 240 mL          CHEST TUBE:  [ ] YES [x ] NO  OUTPUT:     per 24 hours    AIR LEAKS:  [ ] YES [ ] NO      PATEL DRAIN:   [ ] YES [x ] NO  OUTPUT:     per 24 hours    EPICARDIAL WIRES:  [ ] YES [ x] NO      BOWEL MOVEMENT:  [ x] YES [ ] NO      Daily     Daily Weight in k.5 (2022 09:10)  Medications:  acetaminophen     Tablet .. 650 milliGRAM(s) Oral every 6 hours PRN  ascorbic acid 500 milliGRAM(s) Oral two times a day  aspirin enteric coated 81 milliGRAM(s) Oral daily  bisacodyl Suppository 10 milliGRAM(s) Rectal once  chlorhexidine 2% Cloths 1 Application(s) Topical daily  dextrose 40% Gel 15 Gram(s) Oral once  dextrose 50% Injectable 25 Gram(s) IV Push once  dextrose 50% Injectable 12.5 Gram(s) IV Push once  dextrose 50% Injectable 25 Gram(s) IV Push once  enoxaparin Injectable 40 milliGRAM(s) SubCutaneous daily  fenofibrate Tablet 145 milliGRAM(s) Oral daily  gabapentin 100 milliGRAM(s) Oral every 8 hours  glucagon  Injectable 1 milliGRAM(s) IntraMuscular once  insulin glargine Injectable (LANTUS) 48 Unit(s) SubCutaneous at bedtime  insulin lispro (ADMELOG) corrective regimen sliding scale   SubCutaneous three times a day before meals  insulin lispro (ADMELOG) corrective regimen sliding scale   SubCutaneous at bedtime  insulin lispro (ADMELOG) corrective regimen sliding scale   SubCutaneous three times a day before meals  insulin lispro (ADMELOG) corrective regimen sliding scale   SubCutaneous at bedtime  insulin lispro Injectable (ADMELOG) 10 Unit(s) SubCutaneous three times a day before meals  metoprolol succinate ER 50 milliGRAM(s) Oral daily  oxyCODONE    IR 5 milliGRAM(s) Oral every 4 hours PRN  oxyCODONE    IR 10 milliGRAM(s) Oral every 4 hours PRN  polyethylene glycol 3350 17 Gram(s) Oral daily  polyethylene glycol 3350 17 Gram(s) Oral daily  senna 2 Tablet(s) Oral at bedtime  simvastatin 40 milliGRAM(s) Oral at bedtime  sodium chloride 0.9%. 1000 milliLiter(s) IV Continuous <Continuous>        Physical Exam:  Neurology: alert and oriented x 3,     CV : S1  S2   RRR    Sternal Wound :  CDI HARIKA- sternum stable     Lungs: clear. RR easy, unlabored     Abdomen: soft, nontender, nondistended, positive bowel sounds,  + bowel movement;  Neg N/V/D     :  pt voiding without difficulty     Extremities:   CAPPS; neg LE edema, neg calf tenderness.   PPP bilaterally; Left SVG site cdi harika                PAST MEDICAL & SURGICAL HISTORY:  Diabetes    HTN (hypertension)    HLD (hyperlipidemia)    CAD (coronary artery disease)    GERD (gastroesophageal reflux disease)    History of DVT in adulthood  2019, post op in LLE; treated with Eliquis x 6 months, now in ASA 81    H/O fracture of foot  screw and bone graft in left foot, , complicated w/ DVT in LLE    H/O fracture of arm  age 10, LUE    History of tonsillectomy    H/O colonoscopy    S/P cataract extraction and insertion of intraocular lens

## 2022-02-20 NOTE — DISCHARGE NOTE PROVIDER - EXTENDED VTE YES NO FOR MLM ENOXAPARIN
Send her my congrats! Agree with your recs  Cymbalta and meclizine there really isn't great data for or against (like most medications in pregnancy)  If she can do without then I would try  If she wants to talk to her PCP or psych about maybe changing cymbalta to zoloft or something more studied that would be great  If she is doing really well on cymbalta and wants to stay on it I support that as well  ,

## 2022-02-20 NOTE — DISCHARGE NOTE PROVIDER - NSDCFUADDAPPT_GEN_ALL_CORE_FT
Follow up appointment with Trish Gaona one week  Call Cheri on Tuesday to arrange post op visit appt  Cardiac surgery office at [facility] entrance 3  first floor on left after entering [facility]  Office telephone     Your Care Navigator Nurse Practitioner will be in touch to see you in your home within a few days from discharge. The [facility] program can help ensure you understand your medications, discharge instructions and answer any questions you may have at that time. They are also a great source to address concerns during the day and may be reached at [phone].    Endocrine follow up 1 week

## 2022-02-20 NOTE — PROGRESS NOTE ADULT - PROBLEM SELECTOR PLAN 4
Overweight/Obesity: Patient counseled for weight loss, physical activity as tolerated, low calorie diet.
Overweight/Obesity: Patient counseled for weight loss, physical activity as tolerated, low calorie diet.

## 2022-02-20 NOTE — PROGRESS NOTE ADULT - PROBLEM SELECTOR PLAN 3
Suggest to continue medications, monitoring, FU primary team recommendations. .
Suggest to continue medications, monitoring, FU primary team recommendations. .

## 2022-02-20 NOTE — PROGRESS NOTE ADULT - ASSESSMENT
Assessment  DMT2: 61y Male with DM T2 with hyperglycemia, A1C 8.4%, was on oral meds and insulin at home, now on basal bolus insulin, decreased dose yesterday, blood sugars are trending within acceptable range, no hypoglycemic episodes, eating full meals, alert and comfortable, planning DC home.  CAD: s/p CABG, on medications, no chest pain, stable, monitored.  HTN: On meds, monitored.  Obesity: No strict exercise routines, not on any weight loss program, neither on low calorie diet.        Harry Rose MD  Cell: [phone]  Office: [phone]       Assessment  DMT2: 61y Male with DM T2 with hyperglycemia, A1C 8.4%, was on oral meds and insulin at home, now on basal bolus insulin, decreased dose yesterday, blood sugars are trending within acceptable range, no hypoglycemic episodes, eating full meals, alert and comfortable, planning DC home.  CAD: s/p CABG, on medications, no chest pain, stable, monitored.  HTN: On meds, monitored.  Obesity: No strict exercise routines, not on any weight loss program, neither on low calorie diet.        jordan gongora MD  Cell: [phone]

## 2022-02-20 NOTE — DISCHARGE NOTE PROVIDER - CARE PROVIDER_API CALL
Moi Chambers)  Surgery; Thoracic and Cardiac Surgery  [address] [place] [address]  Phone: [phone]  Fax: [phone]  Follow Up Time: 1 week

## 2022-02-20 NOTE — PROGRESS NOTE ADULT - SUBJECTIVE AND OBJECTIVE BOX
Chief complaint  Patient is a 61y old  Male who presents with a chief complaint of s/p CABG (18 Feb 2022 09:34)   Review of systems  Patient awake in chair, well-appearing, no hypoglycemic episodes. Planning DC.    Labs and Fingersticks  CAPILLARY BLOOD GLUCOSE      POCT Blood Glucose.: 140 mg/dL (20 Feb 2022 07:58)  POCT Blood Glucose.: 141 mg/dL (19 Feb 2022 21:29)  POCT Blood Glucose.: 154 mg/dL (19 Feb 2022 16:35)  POCT Blood Glucose.: 188 mg/dL (19 Feb 2022 11:49)      Anion Gap, Serum: 11 (02-20 @ 04:43)  Anion Gap, Serum: 12 (02-19 @ 05:13)      Calcium, Total Serum: 9.2 (02-20 @ 04:43)  Calcium, Total Serum: 9.2 (02-19 @ 05:13)  Albumin, Serum: 3.7 (02-19 @ 05:13)    Alanine Aminotransferase (ALT/SGPT): 18 (02-19 @ 05:13)  Alkaline Phosphatase, Serum: 33 *L* (02-19 @ 05:13)  Aspartate Aminotransferase (AST/SGOT): 21 (02-19 @ 05:13)        02-20    141  |  105  |  14  ----------------------------<  124<H>  3.9   |  25  |  0.83    Ca    9.2      20 Feb 2022 04:43    TPro  6.0  /  Alb  3.7  /  TBili  0.6  /  DBili  x   /  AST  21  /  ALT  18  /  AlkPhos  33<L>  02-19                        11.5   7.19  )-----------( 152      ( 20 Feb 2022 04:43 )             35.1     Medications  MEDICATIONS  (STANDING):  ascorbic acid 500 milliGRAM(s) Oral two times a day  aspirin enteric coated 81 milliGRAM(s) Oral daily  bisacodyl Suppository 10 milliGRAM(s) Rectal once  chlorhexidine 2% Cloths 1 Application(s) Topical daily  dextrose 40% Gel 15 Gram(s) Oral once  dextrose 50% Injectable 25 Gram(s) IV Push once  dextrose 50% Injectable 12.5 Gram(s) IV Push once  dextrose 50% Injectable 25 Gram(s) IV Push once  enoxaparin Injectable 40 milliGRAM(s) SubCutaneous daily  fenofibrate Tablet 145 milliGRAM(s) Oral daily  gabapentin 100 milliGRAM(s) Oral every 8 hours  glucagon  Injectable 1 milliGRAM(s) IntraMuscular once  insulin glargine Injectable (LANTUS) 48 Unit(s) SubCutaneous at bedtime  insulin lispro (ADMELOG) corrective regimen sliding scale   SubCutaneous at bedtime  insulin lispro (ADMELOG) corrective regimen sliding scale   SubCutaneous three times a day before meals  insulin lispro (ADMELOG) corrective regimen sliding scale   SubCutaneous three times a day before meals  insulin lispro (ADMELOG) corrective regimen sliding scale   SubCutaneous at bedtime  insulin lispro Injectable (ADMELOG) 10 Unit(s) SubCutaneous three times a day before meals  metoprolol succinate ER 50 milliGRAM(s) Oral daily  polyethylene glycol 3350 17 Gram(s) Oral daily  polyethylene glycol 3350 17 Gram(s) Oral daily  senna 2 Tablet(s) Oral at bedtime  simvastatin 40 milliGRAM(s) Oral at bedtime  sodium chloride 0.9%. 1000 milliLiter(s) (10 mL/Hr) IV Continuous <Continuous>      Physical Exam  General: Patient comfortable in bed  Vital Signs Last 12 Hrs  T(F): 97.7 (02-20-22 @ 04:43), Max: 97.7 (02-20-22 @ 04:43)  HR: 70 (02-20-22 @ 04:43) (70 - 70)  BP: 119/75 (02-20-22 @ 04:43) (119/75 - 119/75)  BP(mean): 90 (02-20-22 @ 04:43) (90 - 90)  RR: 18 (02-20-22 @ 04:43) (18 - 18)  SpO2: 94% (02-20-22 @ 04:43) (94% - 94%)  Neck: No palpable thyroid nodules.  CVS: S1S2, No murmurs  Respiratory: No wheezing, no crepitations  GI: Abdomen soft, bowel sounds positive  Musculoskeletal:  edema lower extremities.   Skin: No skin rashes, no ecchymosis    Diagnostics

## 2022-02-21 ENCOUNTER — TRANSCRIPTION ENCOUNTER (OUTPATIENT)
Age: 62
End: 2022-02-21

## 2022-02-22 ENCOUNTER — TRANSCRIPTION ENCOUNTER (OUTPATIENT)
Age: 62
End: 2022-02-22

## 2022-02-22 ENCOUNTER — NON-APPOINTMENT (OUTPATIENT)
Age: 62
End: 2022-02-22

## 2022-02-22 PROBLEM — E11.9 TYPE 2 DIABETES MELLITUS WITHOUT COMPLICATIONS: Chronic | Status: ACTIVE | Noted: 2022-02-14

## 2022-02-22 PROBLEM — I25.10 ATHEROSCLEROTIC HEART DISEASE OF NATIVE CORONARY ARTERY WITHOUT ANGINA PECTORIS: Chronic | Status: ACTIVE | Noted: 2022-02-14

## 2022-02-22 PROBLEM — E78.5 HYPERLIPIDEMIA, UNSPECIFIED: Chronic | Status: ACTIVE | Noted: 2022-02-14

## 2022-02-22 PROBLEM — I10 ESSENTIAL (PRIMARY) HYPERTENSION: Chronic | Status: ACTIVE | Noted: 2022-02-14

## 2022-02-22 PROBLEM — K21.9 GASTRO-ESOPHAGEAL REFLUX DISEASE WITHOUT ESOPHAGITIS: Chronic | Status: ACTIVE | Noted: 2022-02-14

## 2022-02-22 PROBLEM — Z86.718 PERSONAL HISTORY OF OTHER VENOUS THROMBOSIS AND EMBOLISM: Chronic | Status: ACTIVE | Noted: 2022-02-14

## 2022-02-22 RX ORDER — NITROGLYCERIN 0.4 MG/1
0.4 TABLET SUBLINGUAL
Qty: 1 | Refills: 0 | Status: DISCONTINUED | COMMUNITY
Start: 2022-01-19 | End: 2022-02-22

## 2022-02-22 RX ORDER — FENOFIBRATE 145 MG/1
145 TABLET, COATED ORAL DAILY
Qty: 90 | Refills: 0 | Status: DISCONTINUED | COMMUNITY
End: 2022-02-22

## 2022-02-22 RX ORDER — ESOMEPRAZOLE MAGNESIUM 20 MG/1
20 CAPSULE, DELAYED RELEASE ORAL
Refills: 0 | Status: ACTIVE | COMMUNITY

## 2022-02-22 RX ORDER — MUPIROCIN 20 MG/G
2 OINTMENT TOPICAL
Qty: 1 | Refills: 0 | Status: DISCONTINUED | COMMUNITY
Start: 2022-02-15 | End: 2022-02-22

## 2022-02-22 RX ORDER — LECITHIN 1200 MG
600 CAPSULE ORAL DAILY
Refills: 0 | Status: DISCONTINUED | COMMUNITY
Start: 2022-01-19 | End: 2022-02-22

## 2022-02-22 RX ORDER — LOSARTAN POTASSIUM 50 MG/1
50 TABLET, FILM COATED ORAL
Qty: 30 | Refills: 3 | Status: DISCONTINUED | COMMUNITY
End: 2022-02-22

## 2022-02-22 RX ORDER — ORAL SEMAGLUTIDE 14 MG/1
14 TABLET ORAL DAILY
Refills: 0 | Status: DISCONTINUED | COMMUNITY
End: 2022-02-22

## 2022-02-24 ENCOUNTER — APPOINTMENT (OUTPATIENT)
Dept: CARE COORDINATION | Facility: HOME HEALTH | Age: 62
End: 2022-02-24
Payer: COMMERCIAL

## 2022-02-24 VITALS
DIASTOLIC BLOOD PRESSURE: 72 MMHG | OXYGEN SATURATION: 98 % | WEIGHT: 211 LBS | HEART RATE: 80 BPM | BODY MASS INDEX: 31.98 KG/M2 | RESPIRATION RATE: 16 BRPM | SYSTOLIC BLOOD PRESSURE: 121 MMHG | HEIGHT: 68 IN

## 2022-02-24 PROCEDURE — 99024 POSTOP FOLLOW-UP VISIT: CPT

## 2022-02-24 NOTE — ASSESSMENT
[FreeTextEntry1] : Pt recovering well at home s/p OHS. Reviewed all medications and dosages with pt understanding. Pt has all medications in home and is taking as prescribed. Pain controlled with current medication regimen. Pt BG avg 140-150 with occasional post prandial >200. Pt to see ENDO on Monday. Pt currently on pre op regimen. No new symptoms, issues or concerns to report at this time.\par

## 2022-02-24 NOTE — HISTORY OF PRESENT ILLNESS
[FreeTextEntry1] : 62 yo male with Pmhx includes HTN, HLD, CAD, DM, ERNESTINE precautions, GERD, history\par of DVT in LLE (post op ankle surgery in 2019, was on Eliquis x 6 months and\par then cleared). Reports he felt "fatigued" and ALEJO after working; over the past\par few months. Cardiac angiogram revealed MVD and pt underwent C3L with Dr. Chambers on 2/16. Post op course included hyperglycemia otherwise unremarkable. Pt remained hemodynamically stable and discharged home with support of spouse/family, home care services and the [facility] team. Initial visit completed in home\par CC "I'm feeling pretty well"\par

## 2022-02-24 NOTE — REASON FOR VISIT
[Follow-Up: _____] : a [unfilled] follow-up visit [Spouse] : spouse [FreeTextEntry1] : [facility]- Transitional Care Management Program- [facility]\par \par

## 2022-02-24 NOTE — PHYSICAL EXAM
[Sclera] : the sclera and conjunctiva were normal [Neck Appearance] : the appearance of the neck was normal [Respiration, Rhythm And Depth] : normal respiratory rhythm and effort [Exaggerated Use Of Accessory Muscles For Inspiration] : no accessory muscle use [Auscultation Breath Sounds / Voice Sounds] : lungs were clear to auscultation bilaterally [Apical Impulse] : the apical impulse was normal [Heart Rate And Rhythm] : heart rate was normal and rhythm regular [Heart Sounds] : normal S1 and S2 [Chest Visual Inspection Thoracic Asymmetry] : no chest asymmetry [1+] : left 1+ [FreeTextEntry2] : B/L LE without edema, B/L calves soft, NT [Bowel Sounds] : normal bowel sounds [Abdomen Soft] : soft [Abdomen Tenderness] : non-tender [Abnormal Walk] : normal gait [Musculoskeletal - Swelling] : no joint swelling seen [Skin Color & Pigmentation] : normal skin color and pigmentation [Skin Turgor] : normal skin turgor [] : no rash [Oriented To Time, Place, And Person] : oriented to person, place, and time [FreeTextEntry1] : SVG harvest site without erythema, warmth or drainage. Resolving soft, NT ecchymosis to thigh area [Impaired Insight] : insight and judgment were intact [Affect] : the affect was normal

## 2022-03-04 ENCOUNTER — TRANSCRIPTION ENCOUNTER (OUTPATIENT)
Age: 62
End: 2022-03-04

## 2022-03-07 ENCOUNTER — NON-APPOINTMENT (OUTPATIENT)
Age: 62
End: 2022-03-07

## 2022-03-11 PROBLEM — Z95.1 S/P CORONARY ARTERY BYPASS GRAFT X 3: Status: ACTIVE | Noted: 2022-02-22

## 2022-03-11 PROBLEM — Z09 POSTOPERATIVE FOLLOW-UP: Status: ACTIVE | Noted: 2022-03-11

## 2022-03-14 ENCOUNTER — APPOINTMENT (OUTPATIENT)
Dept: CARDIOTHORACIC SURGERY | Facility: CLINIC | Age: 62
End: 2022-03-14
Payer: COMMERCIAL

## 2022-03-14 VITALS
HEART RATE: 80 BPM | RESPIRATION RATE: 16 BRPM | OXYGEN SATURATION: 98 % | SYSTOLIC BLOOD PRESSURE: 140 MMHG | HEIGHT: 68 IN | WEIGHT: 207 LBS | BODY MASS INDEX: 31.37 KG/M2 | DIASTOLIC BLOOD PRESSURE: 86 MMHG | TEMPERATURE: 98.2 F

## 2022-03-14 DIAGNOSIS — Z09 ENCOUNTER FOR FOLLOW-UP EXAMINATION AFTER COMPLETED TREATMENT FOR CONDITIONS OTHER THAN MALIGNANT NEOPLASM: ICD-10-CM

## 2022-03-14 DIAGNOSIS — Z95.1 PRESENCE OF AORTOCORONARY BYPASS GRAFT: ICD-10-CM

## 2022-03-14 PROCEDURE — 99024 POSTOP FOLLOW-UP VISIT: CPT

## 2022-03-14 RX ORDER — METOPROLOL SUCCINATE 50 MG/1
50 TABLET, EXTENDED RELEASE ORAL
Qty: 90 | Refills: 2 | Status: ACTIVE | COMMUNITY
Start: 2022-01-19

## 2022-03-21 ENCOUNTER — TRANSCRIPTION ENCOUNTER (OUTPATIENT)
Age: 62
End: 2022-03-21

## 2022-04-06 ENCOUNTER — NON-APPOINTMENT (OUTPATIENT)
Age: 62
End: 2022-04-06

## 2024-01-08 ENCOUNTER — RX ONLY (RX ONLY)
Age: 64
End: 2024-01-08

## 2024-01-08 ENCOUNTER — OFFICE (OUTPATIENT)
Dept: URBAN - METROPOLITAN AREA CLINIC 12 | Facility: CLINIC | Age: 64
Setting detail: OPHTHALMOLOGY
End: 2024-01-08
Payer: COMMERCIAL

## 2024-01-08 DIAGNOSIS — H01.004: ICD-10-CM

## 2024-01-08 DIAGNOSIS — E11.9: ICD-10-CM

## 2024-01-08 DIAGNOSIS — H25.12: ICD-10-CM

## 2024-01-08 DIAGNOSIS — H01.001: ICD-10-CM

## 2024-01-08 PROCEDURE — 92014 COMPRE OPH EXAM EST PT 1/>: CPT | Performed by: OPHTHALMOLOGY

## 2024-01-08 ASSESSMENT — LID EXAM ASSESSMENTS
OD_BLEPHARITIS: 1+
OS_BLEPHARITIS: 1+

## 2024-01-08 ASSESSMENT — CONFRONTATIONAL VISUAL FIELD TEST (CVF)
OS_FINDINGS: FULL
OD_FINDINGS: FULL

## 2024-01-10 ASSESSMENT — REFRACTION_AUTOREFRACTION
OS_AXIS: 007
OD_CYLINDER: -0.25
OD_SPHERE: PLANO
OD_AXIS: 075
OS_SPHERE: -3.00
OS_CYLINDER: -0.75

## 2024-01-10 ASSESSMENT — REFRACTION_MANIFEST
OS_AXIS: 010
OD_CYLINDER: -0.25
OD_VA1: 20/20
OS_SPHERE: -3.00
OD_SPHERE: PLANO
OD_AXIS: 075
OS_VA1: 20/60+1
OS_CYLINDER: -0.75

## 2024-01-10 ASSESSMENT — SPHEQUIV_DERIVED
OS_SPHEQUIV: -3.375
OS_SPHEQUIV: -3.375

## 2024-01-10 ASSESSMENT — REFRACTION_CURRENTRX
OD_VPRISM_DIRECTION: SV
OS_OVR_VA: 20/
OD_OVR_VA: 20/
OS_ADD: +1.75
OD_ADD: +1.75
OS_VPRISM_DIRECTION: SV

## 2024-01-29 ENCOUNTER — OFFICE (OUTPATIENT)
Dept: URBAN - METROPOLITAN AREA CLINIC 12 | Facility: CLINIC | Age: 64
Setting detail: OPHTHALMOLOGY
End: 2024-01-29
Payer: COMMERCIAL

## 2024-01-29 DIAGNOSIS — H25.12: ICD-10-CM

## 2024-01-29 PROBLEM — E11.9 DIABETES TYPE 2 NO RETINOPATHY ; BOTH EYES: Status: ACTIVE | Noted: 2024-01-08

## 2024-01-29 PROBLEM — H01.001 BLEPHARITIS; RIGHT UPPER LID, LEFT UPPER LID: Status: ACTIVE | Noted: 2024-01-08

## 2024-01-29 PROBLEM — H01.004 BLEPHARITIS; RIGHT UPPER LID, LEFT UPPER LID: Status: ACTIVE | Noted: 2024-01-08

## 2024-01-29 PROCEDURE — 99213 OFFICE O/P EST LOW 20 MIN: CPT | Performed by: OPHTHALMOLOGY

## 2024-01-29 PROCEDURE — 92136 OPHTHALMIC BIOMETRY: CPT | Mod: LT | Performed by: OPHTHALMOLOGY

## 2024-01-29 ASSESSMENT — REFRACTION_MANIFEST
OD_CYLINDER: -0.25
OD_AXIS: 075
OD_SPHERE: PLANO
OS_CYLINDER: -0.75
OD_VA1: 20/20
OS_AXIS: 010
OS_SPHERE: -3.00
OS_VA1: 20/60+1

## 2024-01-29 ASSESSMENT — REFRACTION_AUTOREFRACTION
OD_SPHERE: +0.25
OD_AXIS: 88
OS_CYLINDER: -0.75
OD_CYLINDER: -0.50
OS_AXIS: 001
OS_SPHERE: -3.50

## 2024-01-29 ASSESSMENT — REFRACTION_CURRENTRX
OS_ADD: +1.75
OD_OVR_VA: 20/
OS_OVR_VA: 20/
OD_ADD: +1.75
OD_VPRISM_DIRECTION: SV
OS_VPRISM_DIRECTION: SV

## 2024-01-29 ASSESSMENT — SPHEQUIV_DERIVED
OS_SPHEQUIV: -3.375
OS_SPHEQUIV: -3.875
OD_SPHEQUIV: 0

## 2024-01-29 ASSESSMENT — CONFRONTATIONAL VISUAL FIELD TEST (CVF)
OD_FINDINGS: FULL
OS_FINDINGS: FULL

## 2024-01-29 ASSESSMENT — LID EXAM ASSESSMENTS
OS_BLEPHARITIS: 1+
OD_BLEPHARITIS: 1+

## 2024-02-13 ENCOUNTER — ASC (OUTPATIENT)
Dept: URBAN - METROPOLITAN AREA SURGERY 8 | Facility: SURGERY | Age: 64
Setting detail: OPHTHALMOLOGY
End: 2024-02-13
Payer: COMMERCIAL

## 2024-02-13 DIAGNOSIS — H25.12: ICD-10-CM

## 2024-02-13 DIAGNOSIS — H52.212: ICD-10-CM

## 2024-02-13 PROCEDURE — FEMTO FEMTOSECOND LASER: Mod: GY | Performed by: OPHTHALMOLOGY

## 2024-02-13 PROCEDURE — 66984 XCAPSL CTRC RMVL W/O ECP: CPT | Mod: LT | Performed by: OPHTHALMOLOGY

## 2024-02-14 ENCOUNTER — OFFICE (OUTPATIENT)
Dept: URBAN - METROPOLITAN AREA CLINIC 12 | Facility: CLINIC | Age: 64
Setting detail: OPHTHALMOLOGY
End: 2024-02-14
Payer: COMMERCIAL

## 2024-02-14 ENCOUNTER — RX ONLY (RX ONLY)
Age: 64
End: 2024-02-14

## 2024-02-14 DIAGNOSIS — Z96.1: ICD-10-CM

## 2024-02-14 PROCEDURE — 99024 POSTOP FOLLOW-UP VISIT: CPT | Performed by: OPTOMETRIST

## 2024-02-14 ASSESSMENT — LID EXAM ASSESSMENTS
OD_BLEPHARITIS: 1+
OS_BLEPHARITIS: 1+

## 2024-02-14 ASSESSMENT — REFRACTION_MANIFEST
OS_CYLINDER: -0.75
OS_AXIS: 010
OD_VA1: 20/20
OD_SPHERE: PLANO
OD_CYLINDER: -0.25
OS_SPHERE: -3.00
OD_AXIS: 075
OS_VA1: 20/60+1

## 2024-02-14 ASSESSMENT — REFRACTION_CURRENTRX
OD_ADD: +1.75
OS_ADD: +1.75
OD_OVR_VA: 20/
OS_OVR_VA: 20/
OS_VPRISM_DIRECTION: SV
OD_VPRISM_DIRECTION: SV

## 2024-02-14 ASSESSMENT — REFRACTION_AUTOREFRACTION
OS_SPHERE: +0.75
OD_CYLINDER: -0.25
OD_SPHERE: PLANO
OS_CYLINDER: -0.25
OD_AXIS: 088
OS_AXIS: 026

## 2024-02-14 ASSESSMENT — SPHEQUIV_DERIVED
OS_SPHEQUIV: 0.625
OS_SPHEQUIV: -3.375

## 2024-02-14 ASSESSMENT — CONFRONTATIONAL VISUAL FIELD TEST (CVF)
OS_FINDINGS: FULL
OD_FINDINGS: FULL

## 2024-02-16 PROCEDURE — 0: CUSTOM

## 2024-02-19 ENCOUNTER — OFFICE (OUTPATIENT)
Dept: URBAN - METROPOLITAN AREA CLINIC 12 | Facility: CLINIC | Age: 64
Setting detail: OPHTHALMOLOGY
End: 2024-02-19
Payer: COMMERCIAL

## 2024-02-19 DIAGNOSIS — Z96.1: ICD-10-CM

## 2024-02-19 PROCEDURE — 99024 POSTOP FOLLOW-UP VISIT: CPT | Performed by: OPTOMETRIST

## 2024-02-19 ASSESSMENT — REFRACTION_MANIFEST
OD_CYLINDER: -0.25
OS_SPHERE: -3.00
OS_VA1: 20/60+1
OD_AXIS: 075
OS_AXIS: 010
OD_VA1: 20/20
OD_SPHERE: PLANO
OS_CYLINDER: -0.75

## 2024-02-19 ASSESSMENT — REFRACTION_AUTOREFRACTION
OD_AXIS: 096
OD_CYLINDER: -0.25
OD_SPHERE: PLANO
OS_SPHERE: +0.50
OS_AXIS: 006
OS_CYLINDER: -0.25

## 2024-02-19 ASSESSMENT — CONFRONTATIONAL VISUAL FIELD TEST (CVF)
OD_FINDINGS: FULL
OS_FINDINGS: FULL

## 2024-02-19 ASSESSMENT — REFRACTION_CURRENTRX
OD_OVR_VA: 20/
OS_ADD: +1.75
OS_VPRISM_DIRECTION: SV
OS_OVR_VA: 20/
OD_VPRISM_DIRECTION: SV
OD_ADD: +1.75

## 2024-02-19 ASSESSMENT — LID EXAM ASSESSMENTS
OD_BLEPHARITIS: 1+
OS_BLEPHARITIS: 1+

## 2024-02-19 ASSESSMENT — SPHEQUIV_DERIVED
OS_SPHEQUIV: -3.375
OS_SPHEQUIV: 0.375

## 2024-03-12 NOTE — PROGRESS NOTE ADULT - SUBJECTIVE AND OBJECTIVE BOX
VITAL SIGNS    Telemetry:    Vital Signs Last 24 Hrs  T(C): 36.9 (22 @ 05:00), Max: 36.9 (22 @ 05:00)  T(F): 98.4 (22 @ 05:00), Max: 98.4 (22 @ 05:00)  HR: 72 (22 @ 05:00) (71 - 75)  BP: 103/68 (22 @ 05:00) (101/67 - 107/62)  RR: 18 (22 @ 05:00) (17 - 18)  SpO2: 92% (22 @ 05:00) (92% - 97%)             @ 07:01  -   @ 07:00  --------------------------------------------------------  IN: 1290 mL / OUT: 1600 mL / NET: -310 mL     @ 07:01  -   @ 11:00  --------------------------------------------------------  IN: 80 mL / OUT: 400 mL / NET: -320 mL       Daily     Daily Weight in k.9 (2022 08:36)  Admit Wt: Drug Dosing Weight  Height (cm): 172.7 (2022 13:45)  Weight (kg): 101 (2022 13:45)  BMI (kg/m2): 33.9 (2022 13:45)  BSA (m2): 2.14 (2022 13:45)    Bilirubin Total, Serum: 0.6 mg/dL ( @ 05:13)    CAPILLARY BLOOD GLUCOSE      POCT Blood Glucose.: 163 mg/dL (2022 07:46)  POCT Blood Glucose.: 163 mg/dL (2022 21:19)  POCT Blood Glucose.: 181 mg/dL (2022 17:28)  POCT Blood Glucose.: 160 mg/dL (2022 12:04)  POCT Blood Glucose.: 200 mg/dL (2022 11:03)          acetaminophen     Tablet .. 650 milliGRAM(s) Oral every 6 hours  acetaminophen     Tablet .. 650 milliGRAM(s) Oral every 6 hours PRN  ascorbic acid 500 milliGRAM(s) Oral two times a day  aspirin enteric coated 81 milliGRAM(s) Oral daily  bisacodyl Suppository 10 milliGRAM(s) Rectal once  chlorhexidine 2% Cloths 1 Application(s) Topical daily  dextrose 40% Gel 15 Gram(s) Oral once  dextrose 50% Injectable 25 Gram(s) IV Push once  dextrose 50% Injectable 12.5 Gram(s) IV Push once  dextrose 50% Injectable 25 Gram(s) IV Push once  enoxaparin Injectable 40 milliGRAM(s) SubCutaneous daily  fenofibrate Tablet 145 milliGRAM(s) Oral daily  gabapentin 100 milliGRAM(s) Oral every 8 hours  glucagon  Injectable 1 milliGRAM(s) IntraMuscular once  insulin glargine Injectable (LANTUS) 50 Unit(s) SubCutaneous at bedtime  insulin lispro (ADMELOG) corrective regimen sliding scale   SubCutaneous three times a day before meals  insulin lispro (ADMELOG) corrective regimen sliding scale   SubCutaneous at bedtime  insulin lispro (ADMELOG) corrective regimen sliding scale   SubCutaneous three times a day before meals  insulin lispro (ADMELOG) corrective regimen sliding scale   SubCutaneous at bedtime  insulin lispro Injectable (ADMELOG) 12 Unit(s) SubCutaneous three times a day before meals  metoprolol succinate ER 50 milliGRAM(s) Oral daily  oxyCODONE    IR 5 milliGRAM(s) Oral every 4 hours PRN  oxyCODONE    IR 10 milliGRAM(s) Oral every 4 hours PRN  polyethylene glycol 3350 17 Gram(s) Oral daily  polyethylene glycol 3350 17 Gram(s) Oral daily  senna 2 Tablet(s) Oral at bedtime  simvastatin 40 milliGRAM(s) Oral at bedtime  sodium chloride 0.9%. 1000 milliLiter(s) IV Continuous <Continuous>      PHYSICAL EXAM    Subjective: "Hi.   Neurology: alert and oriented x 3, nonfocal, no gross deficits  CV : tele:  RSR  Sternal Wound :  CDI with dressing , Stable  Lungs: clear. RR easy, unlabored   Abdomen: soft, nontender, nondistended, positive bowel sounds, bowel movement   Neg N/V/D   :  pt voiding without difficulty   Extremities:   CAPPS; edema, neg calf tenderness.   PPP bilaterally      PW:  Chest tubes:                 VITAL SIGNS    Telemetry:  rsr 60-70   Vital Signs Last 24 Hrs  T(C): 36.9 (22 @ 05:00), Max: 36.9 (22 @ 05:00)  T(F): 98.4 (22 @ 05:00), Max: 98.4 (22 @ 05:00)  HR: 72 (22 @ 05:00) (71 - 75)  BP: 103/68 (22 @ 05:00) (101/67 - 107/62)  RR: 18 (22 @ 05:00) (17 - 18)  SpO2: 92% (22 @ 05:00) (92% - 97%)             @ 07:01  -   @ 07:00  --------------------------------------------------------  IN: 1290 mL / OUT: 1600 mL / NET: -310 mL     @ 07:01  -   @ 11:00  --------------------------------------------------------  IN: 80 mL / OUT: 400 mL / NET: -320 mL       Daily     Daily Weight in k.9 (2022 08:36)  Admit Wt: Drug Dosing Weight  Height (cm): 172.7 (2022 13:45)  Weight (kg): 101 (2022 13:45)  BMI (kg/m2): 33.9 (2022 13:45)  BSA (m2): 2.14 (2022 13:45)    Bilirubin Total, Serum: 0.6 mg/dL ( @ 05:13)    CAPILLARY BLOOD GLUCOSE      POCT Blood Glucose.: 163 mg/dL (2022 07:46)  POCT Blood Glucose.: 163 mg/dL (2022 21:19)  POCT Blood Glucose.: 181 mg/dL (2022 17:28)  POCT Blood Glucose.: 160 mg/dL (2022 12:04)  POCT Blood Glucose.: 200 mg/dL (2022 11:03)          acetaminophen     Tablet .. 650 milliGRAM(s) Oral every 6 hours  acetaminophen     Tablet .. 650 milliGRAM(s) Oral every 6 hours PRN  ascorbic acid 500 milliGRAM(s) Oral two times a day  aspirin enteric coated 81 milliGRAM(s) Oral daily  bisacodyl Suppository 10 milliGRAM(s) Rectal once  chlorhexidine 2% Cloths 1 Application(s) Topical daily  dextrose 40% Gel 15 Gram(s) Oral once  dextrose 50% Injectable 25 Gram(s) IV Push once  dextrose 50% Injectable 12.5 Gram(s) IV Push once  dextrose 50% Injectable 25 Gram(s) IV Push once  enoxaparin Injectable 40 milliGRAM(s) SubCutaneous daily  fenofibrate Tablet 145 milliGRAM(s) Oral daily  gabapentin 100 milliGRAM(s) Oral every 8 hours  glucagon  Injectable 1 milliGRAM(s) IntraMuscular once  insulin glargine Injectable (LANTUS) 50 Unit(s) SubCutaneous at bedtime  insulin lispro (ADMELOG) corrective regimen sliding scale   SubCutaneous three times a day before meals  insulin lispro (ADMELOG) corrective regimen sliding scale   SubCutaneous at bedtime  insulin lispro (ADMELOG) corrective regimen sliding scale   SubCutaneous three times a day before meals  insulin lispro (ADMELOG) corrective regimen sliding scale   SubCutaneous at bedtime  insulin lispro Injectable (ADMELOG) 12 Unit(s) SubCutaneous three times a day before meals  metoprolol succinate ER 50 milliGRAM(s) Oral daily  oxyCODONE    IR 5 milliGRAM(s) Oral every 4 hours PRN  oxyCODONE    IR 10 milliGRAM(s) Oral every 4 hours PRN  polyethylene glycol 3350 17 Gram(s) Oral daily  polyethylene glycol 3350 17 Gram(s) Oral daily  senna 2 Tablet(s) Oral at bedtime  simvastatin 40 milliGRAM(s) Oral at bedtime  sodium chloride 0.9%. 1000 milliLiter(s) IV Continuous <Continuous>        PHYSICAL EXAM    Subjective: "When can I go home."   Neurology: alert and oriented x 3, nonfocal, no gross deficits  CV : tele:  RSR 60-70   Sternal Wound :  CDI HARIKA- sternum stable   Lungs: clear. RR easy, unlabored   Abdomen: soft, nontender, nondistended, positive bowel sounds,  + bowel movement;  Neg N/V/D   :  pt voiding without difficulty   Extremities:   CAPPS; neg LE edema, neg calf tenderness.   PPP bilaterally; Left SVG site cdi harika       PW: + VVI -d/c this am   Chest tubes: none             [Home] : at home, [unfilled] , at the time of the visit. [Medical Office: ([facility])___] : at the medical office located in  [Verbal consent obtained from patient] : the patient, [unfilled] [Assessment] : an assessment

## 2024-03-25 ENCOUNTER — OFFICE (OUTPATIENT)
Dept: URBAN - METROPOLITAN AREA CLINIC 12 | Facility: CLINIC | Age: 64
Setting detail: OPHTHALMOLOGY
End: 2024-03-25
Payer: COMMERCIAL

## 2024-03-25 DIAGNOSIS — Z96.1: ICD-10-CM

## 2024-03-25 PROCEDURE — 99024 POSTOP FOLLOW-UP VISIT: CPT | Performed by: OPTOMETRIST

## 2024-03-25 ASSESSMENT — REFRACTION_CURRENTRX
OS_ADD: +1.75
OD_ADD: +1.75
OD_OVR_VA: 20/
OD_VPRISM_DIRECTION: SV
OS_OVR_VA: 20/
OS_VPRISM_DIRECTION: SV

## 2024-03-25 ASSESSMENT — REFRACTION_MANIFEST
OS_VA1: 20/60+1
OD_VA1: 20/20
OD_SPHERE: PLANO
OD_AXIS: 075
OS_CYLINDER: -0.75
OS_AXIS: 010
OD_CYLINDER: -0.25
OS_SPHERE: -3.00

## 2024-03-25 ASSESSMENT — LID EXAM ASSESSMENTS
OD_BLEPHARITIS: 1+
OS_BLEPHARITIS: 1+

## 2024-03-25 ASSESSMENT — SPHEQUIV_DERIVED: OS_SPHEQUIV: -3.375

## 2024-06-24 ENCOUNTER — OFFICE (OUTPATIENT)
Dept: URBAN - METROPOLITAN AREA CLINIC 12 | Facility: CLINIC | Age: 64
Setting detail: OPHTHALMOLOGY
End: 2024-06-24
Payer: COMMERCIAL

## 2024-06-24 DIAGNOSIS — H40.013: ICD-10-CM

## 2024-06-24 DIAGNOSIS — H01.001: ICD-10-CM

## 2024-06-24 DIAGNOSIS — H01.004: ICD-10-CM

## 2024-06-24 PROCEDURE — 92250 FUNDUS PHOTOGRAPHY W/I&R: CPT | Performed by: OPTOMETRIST

## 2024-06-24 PROCEDURE — 92014 COMPRE OPH EXAM EST PT 1/>: CPT | Performed by: OPTOMETRIST

## 2024-06-24 ASSESSMENT — LID EXAM ASSESSMENTS
OS_BLEPHARITIS: 1+
OD_BLEPHARITIS: 1+

## 2024-06-24 ASSESSMENT — CONFRONTATIONAL VISUAL FIELD TEST (CVF)
OD_FINDINGS: FULL
OS_FINDINGS: FULL

## 2024-10-03 ENCOUNTER — TRANSCRIPTION ENCOUNTER (OUTPATIENT)
Age: 64
End: 2024-10-03

## 2024-10-03 ENCOUNTER — RESULT REVIEW (OUTPATIENT)
Age: 64
End: 2024-10-03

## 2024-10-03 ENCOUNTER — APPOINTMENT (OUTPATIENT)
Dept: GASTROENTEROLOGY | Facility: HOSPITAL | Age: 64
End: 2024-10-03

## 2024-10-03 ENCOUNTER — OUTPATIENT (OUTPATIENT)
Dept: OUTPATIENT SERVICES | Facility: HOSPITAL | Age: 64
LOS: 1 days | End: 2024-10-03
Payer: COMMERCIAL

## 2024-10-03 DIAGNOSIS — Z90.89 ACQUIRED ABSENCE OF OTHER ORGANS: Chronic | ICD-10-CM

## 2024-10-03 DIAGNOSIS — Z87.81 PERSONAL HISTORY OF (HEALED) TRAUMATIC FRACTURE: Chronic | ICD-10-CM

## 2024-10-03 DIAGNOSIS — K86.89 OTHER SPECIFIED DISEASES OF PANCREAS: ICD-10-CM

## 2024-10-03 DIAGNOSIS — K86.2 CYST OF PANCREAS: ICD-10-CM

## 2024-10-03 DIAGNOSIS — Z98.890 OTHER SPECIFIED POSTPROCEDURAL STATES: Chronic | ICD-10-CM

## 2024-10-03 DIAGNOSIS — Z98.49 CATARACT EXTRACTION STATUS, UNSPECIFIED EYE: Chronic | ICD-10-CM

## 2024-10-03 LAB — GLUCOSE BLDC GLUCOMTR-MCNC: 105 MG/DL — HIGH (ref 70–99)

## 2024-10-03 PROCEDURE — 88305 TISSUE EXAM BY PATHOLOGIST: CPT

## 2024-10-03 PROCEDURE — 43242 EGD US FINE NEEDLE BX/ASPIR: CPT

## 2024-10-03 PROCEDURE — 88342 IMHCHEM/IMCYTCHM 1ST ANTB: CPT | Mod: 26

## 2024-10-03 PROCEDURE — 88342 IMHCHEM/IMCYTCHM 1ST ANTB: CPT

## 2024-10-03 PROCEDURE — 43238 EGD US FINE NEEDLE BX/ASPIR: CPT

## 2024-10-03 PROCEDURE — 88305 TISSUE EXAM BY PATHOLOGIST: CPT | Mod: 26

## 2024-10-03 PROCEDURE — 88307 TISSUE EXAM BY PATHOLOGIST: CPT

## 2024-10-03 PROCEDURE — 88173 CYTOPATH EVAL FNA REPORT: CPT

## 2024-10-03 PROCEDURE — 82962 GLUCOSE BLOOD TEST: CPT

## 2024-10-03 PROCEDURE — 88173 CYTOPATH EVAL FNA REPORT: CPT | Mod: 26

## 2024-10-03 PROCEDURE — 88307 TISSUE EXAM BY PATHOLOGIST: CPT | Mod: 26

## 2024-10-03 PROCEDURE — 43239 EGD BIOPSY SINGLE/MULTIPLE: CPT | Mod: XS

## 2024-10-03 PROCEDURE — 43239 EGD BIOPSY SINGLE/MULTIPLE: CPT | Mod: 59

## 2024-10-03 NOTE — PHYSICAL EXAM

## 2024-10-08 LAB — SURGICAL PATHOLOGY STUDY: SIGNIFICANT CHANGE UP

## 2024-10-11 LAB — NON-GYNECOLOGICAL CYTOLOGY STUDY: SIGNIFICANT CHANGE UP

## 2024-10-14 DIAGNOSIS — K86.89 OTHER SPECIFIED DISEASES OF PANCREAS: ICD-10-CM

## 2024-10-15 ENCOUNTER — NON-APPOINTMENT (OUTPATIENT)
Age: 64
End: 2024-10-15

## 2024-10-17 ENCOUNTER — LABORATORY RESULT (OUTPATIENT)
Age: 64
End: 2024-10-17

## 2024-10-17 ENCOUNTER — OUTPATIENT (OUTPATIENT)
Dept: OUTPATIENT SERVICES | Facility: HOSPITAL | Age: 64
LOS: 1 days | End: 2024-10-17
Payer: COMMERCIAL

## 2024-10-17 ENCOUNTER — APPOINTMENT (OUTPATIENT)
Dept: CT IMAGING | Facility: CLINIC | Age: 64
End: 2024-10-17
Payer: COMMERCIAL

## 2024-10-17 DIAGNOSIS — K86.89 OTHER SPECIFIED DISEASES OF PANCREAS: ICD-10-CM

## 2024-10-17 DIAGNOSIS — Z98.890 OTHER SPECIFIED POSTPROCEDURAL STATES: Chronic | ICD-10-CM

## 2024-10-17 DIAGNOSIS — Z87.81 PERSONAL HISTORY OF (HEALED) TRAUMATIC FRACTURE: Chronic | ICD-10-CM

## 2024-10-17 DIAGNOSIS — Z98.49 CATARACT EXTRACTION STATUS, UNSPECIFIED EYE: Chronic | ICD-10-CM

## 2024-10-17 DIAGNOSIS — Z90.89 ACQUIRED ABSENCE OF OTHER ORGANS: Chronic | ICD-10-CM

## 2024-10-17 PROCEDURE — 74177 CT ABD & PELVIS W/CONTRAST: CPT

## 2024-10-17 PROCEDURE — 71260 CT THORAX DX C+: CPT

## 2024-10-17 PROCEDURE — 74177 CT ABD & PELVIS W/CONTRAST: CPT | Mod: 26

## 2024-10-17 PROCEDURE — 71260 CT THORAX DX C+: CPT | Mod: 26

## 2024-10-21 LAB
BASOPHILS # BLD AUTO: 0.04 K/UL
BASOPHILS NFR BLD AUTO: 0.6 %
CANCER AG19-9 SERPL-ACNC: 47 U/ML
CEA SERPL-MCNC: 1.6 NG/ML
EOSINOPHIL # BLD AUTO: 0.08 K/UL
EOSINOPHIL NFR BLD AUTO: 1.1 %
ESTIMATED AVERAGE GLUCOSE: 151 MG/DL
HBA1C MFR BLD HPLC: 6.9 %
HCT VFR BLD CALC: 41.8 %
HGB BLD-MCNC: 13.3 G/DL
IGG4 SER-MCNC: 3.3 MG/DL
IMM GRANULOCYTES NFR BLD AUTO: 0.4 %
LYMPHOCYTES # BLD AUTO: 1.52 K/UL
LYMPHOCYTES NFR BLD AUTO: 21.6 %
MAN DIFF?: NORMAL
MCHC RBC-ENTMCNC: 27.3 PG
MCHC RBC-ENTMCNC: 31.8 GM/DL
MCV RBC AUTO: 85.7 FL
MONOCYTES # BLD AUTO: 0.74 K/UL
MONOCYTES NFR BLD AUTO: 10.5 %
NEUTROPHILS # BLD AUTO: 4.64 K/UL
NEUTROPHILS NFR BLD AUTO: 65.8 %
PLATELET # BLD AUTO: 238 K/UL
RBC # BLD: 4.88 M/UL
RBC # FLD: 15.3 %
WBC # FLD AUTO: 7.05 K/UL

## 2024-10-22 ENCOUNTER — NON-APPOINTMENT (OUTPATIENT)
Age: 64
End: 2024-10-22

## 2024-10-23 ENCOUNTER — NON-APPOINTMENT (OUTPATIENT)
Age: 64
End: 2024-10-23

## 2024-10-23 ENCOUNTER — OUTPATIENT (OUTPATIENT)
Dept: OUTPATIENT SERVICES | Facility: HOSPITAL | Age: 64
LOS: 1 days | Discharge: HOME | End: 2024-10-23

## 2024-10-23 DIAGNOSIS — Z98.49 CATARACT EXTRACTION STATUS, UNSPECIFIED EYE: Chronic | ICD-10-CM

## 2024-10-23 DIAGNOSIS — R79.9 ABNORMAL FINDING OF BLOOD CHEMISTRY, UNSPECIFIED: ICD-10-CM

## 2024-10-23 DIAGNOSIS — Z90.89 ACQUIRED ABSENCE OF OTHER ORGANS: Chronic | ICD-10-CM

## 2024-10-23 DIAGNOSIS — Z87.81 PERSONAL HISTORY OF (HEALED) TRAUMATIC FRACTURE: Chronic | ICD-10-CM

## 2024-10-23 DIAGNOSIS — Z98.890 OTHER SPECIFIED POSTPROCEDURAL STATES: Chronic | ICD-10-CM

## 2024-10-28 PROBLEM — Z76.89 ENCOUNTER TO ESTABLISH CARE: Status: ACTIVE | Noted: 2024-10-28

## 2024-10-29 ENCOUNTER — APPOINTMENT (OUTPATIENT)
Dept: HEMATOLOGY ONCOLOGY | Facility: CLINIC | Age: 64
End: 2024-10-29
Payer: COMMERCIAL

## 2024-10-29 ENCOUNTER — APPOINTMENT (OUTPATIENT)
Dept: SURGICAL ONCOLOGY | Facility: CLINIC | Age: 64
End: 2024-10-29
Payer: COMMERCIAL

## 2024-10-29 ENCOUNTER — RESULT REVIEW (OUTPATIENT)
Age: 64
End: 2024-10-29

## 2024-10-29 ENCOUNTER — NON-APPOINTMENT (OUTPATIENT)
Age: 64
End: 2024-10-29

## 2024-10-29 VITALS
WEIGHT: 212.53 LBS | TEMPERATURE: 97 F | HEART RATE: 51 BPM | DIASTOLIC BLOOD PRESSURE: 79 MMHG | SYSTOLIC BLOOD PRESSURE: 118 MMHG | HEIGHT: 67 IN | BODY MASS INDEX: 33.36 KG/M2 | OXYGEN SATURATION: 99 %

## 2024-10-29 DIAGNOSIS — Z76.89 PERSONS ENCOUNTERING HEALTH SERVICES IN OTHER SPECIFIED CIRCUMSTANCES: ICD-10-CM

## 2024-10-29 DIAGNOSIS — K86.89 OTHER SPECIFIED DISEASES OF PANCREAS: ICD-10-CM

## 2024-10-29 LAB
BASOPHILS # BLD AUTO: 0.1 K/UL — SIGNIFICANT CHANGE UP (ref 0–0.2)
BASOPHILS NFR BLD AUTO: 2.4 % — HIGH (ref 0–2)
EOSINOPHIL # BLD AUTO: 0.1 K/UL — SIGNIFICANT CHANGE UP (ref 0–0.5)
EOSINOPHIL NFR BLD AUTO: 1.7 % — SIGNIFICANT CHANGE UP (ref 0–6)
HCT VFR BLD CALC: 43.7 % — SIGNIFICANT CHANGE UP (ref 39–50)
HGB BLD-MCNC: 13.3 G/DL — SIGNIFICANT CHANGE UP (ref 13–17)
LYMPHOCYTES # BLD AUTO: 1 K/UL — SIGNIFICANT CHANGE UP (ref 1–3.3)
LYMPHOCYTES # BLD AUTO: 17.4 % — SIGNIFICANT CHANGE UP (ref 13–44)
MCHC RBC-ENTMCNC: 27.2 PG — SIGNIFICANT CHANGE UP (ref 27–34)
MCHC RBC-ENTMCNC: 30.4 G/DL — LOW (ref 32–36)
MCV RBC AUTO: 89.4 FL — SIGNIFICANT CHANGE UP (ref 80–100)
MONOCYTES # BLD AUTO: 0.4 K/UL — SIGNIFICANT CHANGE UP (ref 0–0.9)
MONOCYTES NFR BLD AUTO: 7.2 % — SIGNIFICANT CHANGE UP (ref 2–14)
NEUTROPHILS # BLD AUTO: 4.2 K/UL — SIGNIFICANT CHANGE UP (ref 1.8–7.4)
NEUTROPHILS NFR BLD AUTO: 71.3 % — SIGNIFICANT CHANGE UP (ref 43–77)
PLATELET # BLD AUTO: 195 K/UL — SIGNIFICANT CHANGE UP (ref 150–400)
RBC # BLD: 4.88 M/UL — SIGNIFICANT CHANGE UP (ref 4.2–5.8)
RBC # FLD: 14.1 % — SIGNIFICANT CHANGE UP (ref 10.3–14.5)
WBC # BLD: 5.9 K/UL — SIGNIFICANT CHANGE UP (ref 3.8–10.5)
WBC # FLD AUTO: 5.9 K/UL — SIGNIFICANT CHANGE UP (ref 3.8–10.5)

## 2024-10-29 PROCEDURE — G2211 COMPLEX E/M VISIT ADD ON: CPT | Mod: NC

## 2024-10-29 PROCEDURE — 99205 OFFICE O/P NEW HI 60 MIN: CPT

## 2024-10-30 LAB
ALBUMIN SERPL ELPH-MCNC: 4.5 G/DL
ALP BLD-CCNC: 41 U/L
ALT SERPL-CCNC: 24 U/L
ANION GAP SERPL CALC-SCNC: 14 MMOL/L
APTT BLD: 26.9 SEC
AST SERPL-CCNC: 29 U/L
BILIRUB SERPL-MCNC: 0.3 MG/DL
BUN SERPL-MCNC: 16 MG/DL
CALCIUM SERPL-MCNC: 9.5 MG/DL
CANCER AG19-9 SERPL-ACNC: 34 U/ML
CHLORIDE SERPL-SCNC: 105 MMOL/L
CO2 SERPL-SCNC: 23 MMOL/L
CREAT SERPL-MCNC: 1.29 MG/DL
EGFR: 62 ML/MIN/1.73M2
GLUCOSE SERPL-MCNC: 194 MG/DL
HAV IGM SER QL: NONREACTIVE
HBV CORE IGG+IGM SER QL: NONREACTIVE
HBV CORE IGM SER QL: NONREACTIVE
HBV SURFACE AB SER QL: NONREACTIVE
HBV SURFACE AG SER QL: NONREACTIVE
HCV AB SER QL: NONREACTIVE
HCV S/CO RATIO: 0.47 S/CO
INR PPP: 0.94 RATIO
MAGNESIUM SERPL-MCNC: 2.2 MG/DL
POTASSIUM SERPL-SCNC: 4.5 MMOL/L
PROT SERPL-MCNC: 6.8 G/DL
PT BLD: 11.1 SEC
SODIUM SERPL-SCNC: 142 MMOL/L

## 2024-11-01 ENCOUNTER — NON-APPOINTMENT (OUTPATIENT)
Age: 64
End: 2024-11-01

## 2024-11-29 ENCOUNTER — RESULT REVIEW (OUTPATIENT)
Age: 64
End: 2024-11-29

## 2024-11-29 ENCOUNTER — APPOINTMENT (OUTPATIENT)
Dept: HEMATOLOGY ONCOLOGY | Facility: CLINIC | Age: 64
End: 2024-11-29

## 2024-11-29 LAB
ALBUMIN SERPL ELPH-MCNC: 4.3 G/DL
ALP BLD-CCNC: 34 U/L
ALT SERPL-CCNC: 21 U/L
ANION GAP SERPL CALC-SCNC: 17 MMOL/L
AST SERPL-CCNC: 25 U/L
BASOPHILS # BLD AUTO: 0.1 K/UL — SIGNIFICANT CHANGE UP (ref 0–0.2)
BASOPHILS # BLD AUTO: 0.1 K/UL — SIGNIFICANT CHANGE UP (ref 0–0.2)
BASOPHILS NFR BLD AUTO: 1.5 % — SIGNIFICANT CHANGE UP (ref 0–2)
BASOPHILS NFR BLD AUTO: 1.9 % — SIGNIFICANT CHANGE UP (ref 0–2)
BILIRUB SERPL-MCNC: 0.2 MG/DL
BUN SERPL-MCNC: 14 MG/DL
CALCIUM SERPL-MCNC: 9.3 MG/DL
CANCER AG19-9 SERPL-ACNC: 32 U/ML
CHLORIDE SERPL-SCNC: 109 MMOL/L
CO2 SERPL-SCNC: 21 MMOL/L
CREAT SERPL-MCNC: 0.95 MG/DL
EGFR: 90 ML/MIN/1.73M2
EOSINOPHIL # BLD AUTO: 0.1 K/UL — SIGNIFICANT CHANGE UP (ref 0–0.5)
EOSINOPHIL # BLD AUTO: 0.1 K/UL — SIGNIFICANT CHANGE UP (ref 0–0.5)
EOSINOPHIL NFR BLD AUTO: 1.9 % — SIGNIFICANT CHANGE UP (ref 0–6)
EOSINOPHIL NFR BLD AUTO: 2.1 % — SIGNIFICANT CHANGE UP (ref 0–6)
GLUCOSE SERPL-MCNC: 194 MG/DL
HCT VFR BLD CALC: 42.9 % — SIGNIFICANT CHANGE UP (ref 39–50)
HGB BLD-MCNC: 13.4 G/DL — SIGNIFICANT CHANGE UP (ref 13–17)
LYMPHOCYTES # BLD AUTO: 1 K/UL — SIGNIFICANT CHANGE UP (ref 1–3.3)
LYMPHOCYTES # BLD AUTO: 1.1 K/UL — SIGNIFICANT CHANGE UP (ref 1–3.3)
LYMPHOCYTES # BLD AUTO: 19.2 % — SIGNIFICANT CHANGE UP (ref 13–44)
LYMPHOCYTES # BLD AUTO: 20.5 % — SIGNIFICANT CHANGE UP (ref 13–44)
MAGNESIUM SERPL-MCNC: 2 MG/DL
MCHC RBC-ENTMCNC: 26.6 PG — LOW (ref 27–34)
MCHC RBC-ENTMCNC: 31.3 G/DL — LOW (ref 32–36)
MCV RBC AUTO: 84.8 FL — SIGNIFICANT CHANGE UP (ref 80–100)
MONOCYTES # BLD AUTO: 0.5 K/UL — SIGNIFICANT CHANGE UP (ref 0–0.9)
MONOCYTES # BLD AUTO: 0.5 K/UL — SIGNIFICANT CHANGE UP (ref 0–0.9)
MONOCYTES NFR BLD AUTO: 10.3 % — SIGNIFICANT CHANGE UP (ref 2–14)
MONOCYTES NFR BLD AUTO: 9.3 % — SIGNIFICANT CHANGE UP (ref 2–14)
NEUTROPHILS # BLD AUTO: 3.5 K/UL — SIGNIFICANT CHANGE UP (ref 1.8–7.4)
NEUTROPHILS # BLD AUTO: 3.5 K/UL — SIGNIFICANT CHANGE UP (ref 1.8–7.4)
NEUTROPHILS NFR BLD AUTO: 66.2 % — SIGNIFICANT CHANGE UP (ref 43–77)
NEUTROPHILS NFR BLD AUTO: 67.1 % — SIGNIFICANT CHANGE UP (ref 43–77)
PLATELET # BLD AUTO: 201 K/UL — SIGNIFICANT CHANGE UP (ref 150–400)
POTASSIUM SERPL-SCNC: 4.7 MMOL/L
PROT SERPL-MCNC: 6.7 G/DL
RBC # BLD: 5.06 M/UL — SIGNIFICANT CHANGE UP (ref 4.2–5.8)
RBC # FLD: 13.8 % — SIGNIFICANT CHANGE UP (ref 10.3–14.5)
SODIUM SERPL-SCNC: 146 MMOL/L
WBC # BLD: 5.2 K/UL — SIGNIFICANT CHANGE UP (ref 3.8–10.5)
WBC # FLD AUTO: 5.2 K/UL — SIGNIFICANT CHANGE UP (ref 3.8–10.5)

## 2024-12-02 ENCOUNTER — APPOINTMENT (OUTPATIENT)
Dept: HEMATOLOGY ONCOLOGY | Facility: CLINIC | Age: 64
End: 2024-12-02
Payer: COMMERCIAL

## 2024-12-02 PROCEDURE — 99442: CPT

## 2024-12-13 ENCOUNTER — APPOINTMENT (OUTPATIENT)
Dept: MRI IMAGING | Facility: CLINIC | Age: 64
End: 2024-12-13
Payer: COMMERCIAL

## 2024-12-13 PROCEDURE — A9585: CPT

## 2024-12-13 PROCEDURE — 74183 MRI ABD W/O CNTR FLWD CNTR: CPT

## 2024-12-19 ENCOUNTER — APPOINTMENT (OUTPATIENT)
Dept: SURGICAL ONCOLOGY | Facility: CLINIC | Age: 64
End: 2024-12-19
Payer: COMMERCIAL

## 2024-12-19 VITALS
DIASTOLIC BLOOD PRESSURE: 78 MMHG | HEIGHT: 68 IN | SYSTOLIC BLOOD PRESSURE: 124 MMHG | BODY MASS INDEX: 32.58 KG/M2 | HEART RATE: 57 BPM | OXYGEN SATURATION: 97 % | WEIGHT: 215 LBS

## 2024-12-19 PROCEDURE — 99214 OFFICE O/P EST MOD 30 MIN: CPT

## 2025-07-30 ENCOUNTER — APPOINTMENT (OUTPATIENT)
Dept: GASTROENTEROLOGY | Facility: CLINIC | Age: 65
End: 2025-07-30
Payer: COMMERCIAL

## 2025-07-30 ENCOUNTER — NON-APPOINTMENT (OUTPATIENT)
Age: 65
End: 2025-07-30

## 2025-07-30 VITALS
BODY MASS INDEX: 34.25 KG/M2 | WEIGHT: 226 LBS | HEIGHT: 68 IN | HEART RATE: 70 BPM | SYSTOLIC BLOOD PRESSURE: 120 MMHG | RESPIRATION RATE: 15 BRPM | DIASTOLIC BLOOD PRESSURE: 70 MMHG | OXYGEN SATURATION: 98 %

## 2025-07-30 DIAGNOSIS — K86.89 OTHER SPECIFIED DISEASES OF PANCREAS: ICD-10-CM

## 2025-07-30 PROCEDURE — G2211 COMPLEX E/M VISIT ADD ON: CPT | Mod: NC

## 2025-07-30 PROCEDURE — 99214 OFFICE O/P EST MOD 30 MIN: CPT

## 2025-09-19 ENCOUNTER — APPOINTMENT (OUTPATIENT)
Dept: GASTROENTEROLOGY | Facility: HOSPITAL | Age: 65
End: 2025-09-19

## 2025-09-19 ENCOUNTER — TRANSCRIPTION ENCOUNTER (OUTPATIENT)
Age: 65
End: 2025-09-19

## 2025-09-19 DIAGNOSIS — Q45.3 OTHER CONGENITAL MALFORMATIONS OF PANCREAS AND PANCREATIC DUCT: ICD-10-CM

## 2025-09-19 DIAGNOSIS — K86.1 OTHER CHRONIC PANCREATITIS: ICD-10-CM

## 2025-09-20 PROBLEM — K86.1 CHRONIC PANCREATITIS, UNSPECIFIED PANCREATITIS TYPE: Status: ACTIVE | Noted: 2025-09-20

## 2025-09-20 PROBLEM — Q45.3 PANCREAS DIVISUM: Status: ACTIVE | Noted: 2025-09-20

## (undated) DEVICE — SUT SOFSILK 0 30" TIES

## (undated) DEVICE — SYR SLIP 10CC

## (undated) DEVICE — PACK UNIVERSAL CARDIAC

## (undated) DEVICE — PACK CARDIAC YELLOW

## (undated) DEVICE — SYR LUER LOK 50CC

## (undated) DEVICE — DRAPE 3/4 SHEET W REINFORCEMENT 56X77"

## (undated) DEVICE — VESSEL LOOP KEY SURG MAXI BLUE 2.4X1.2MM

## (undated) DEVICE — WARMING BLANKET FULL ADULT

## (undated) DEVICE — SUT SILK 2-0 18" SH (POP-OFF)

## (undated) DEVICE — GOWN IMPERV XL

## (undated) DEVICE — SENSOR O2 FINGER ADULT

## (undated) DEVICE — SYR LUER LOK 30CC

## (undated) DEVICE — DRSG 2X2

## (undated) DEVICE — BULLDOG SPRING CLIP LATIS/LATIS 6MM 1/2 FORCE (BLUE)

## (undated) DEVICE — GLV 7 PROTEXIS (WHITE)

## (undated) DEVICE — SUT PROLENE 4-0 30" SH-1

## (undated) DEVICE — DENTURE CUP PINK

## (undated) DEVICE — SYNOVIS VASCULAR PROBE 1.5MM 15CM

## (undated) DEVICE — GOWN LG

## (undated) DEVICE — VESSEL LOOP ASPEN MAXI RED

## (undated) DEVICE — GLV 8 PROTEXIS (WHITE)

## (undated) DEVICE — SOL BAG NS 0.9% 1000ML

## (undated) DEVICE — CHEST DRAIN OASIS DRY SUCTION WATER SEAL

## (undated) DEVICE — DRSG TEGADERM 6"X8"

## (undated) DEVICE — TUBING IV EXTENSION MACRO W CLAVE 7"

## (undated) DEVICE — BASIN EMESIS 10IN GRADUATED MAUVE

## (undated) DEVICE — NDL ASPIRATION EZ SHOT 22G SNGL USE

## (undated) DEVICE — MEDTRONIC CLEARVIEW BLOWER MISTER KIT W TUBING SET

## (undated) DEVICE — CONNECTOR STRAIGHT 3/8 X 1/2"

## (undated) DEVICE — SPECIMEN CONTAINER 100ML

## (undated) DEVICE — BEAVER BLADE MINI SHARP ALL ROUND (BLUE)

## (undated) DEVICE — MASK PROCEED EARLOOP LVL 2 50/BX

## (undated) DEVICE — SYR LUER SLIP TIP 50CC

## (undated) DEVICE — DRAPE TOWEL BLUE 17" X 24"

## (undated) DEVICE — CATH IV SAFE BC 22G X 1" (BLUE)

## (undated) DEVICE — SYR LUER LOK 3CC

## (undated) DEVICE — SUT ETHIBOND 1 30" CT-1

## (undated) DEVICE — CANISTER SUCTION 2000CC

## (undated) DEVICE — FEEDING TUBE NG SUMP 16FR 48"

## (undated) DEVICE — TUBING IV SET MICROCLAVE ADAPTER

## (undated) DEVICE — SYS VEIN HARVESTING VIRTUOSAPH PLUS W/ RADIAL

## (undated) DEVICE — POSITIONER CARDIAC BUMP

## (undated) DEVICE — FOLEY TRAY 16FR 5CC LF LUBRISIL ADVANCE TEMP CLOSED

## (undated) DEVICE — DRSG CURITY GAUZE SPONGE 4 X 4" 12-PLY NON-STERILE

## (undated) DEVICE — SOL IRR BAG H2O 1000ML

## (undated) DEVICE — STAPLER SKIN VISI-STAT 35 WIDE

## (undated) DEVICE — TUBING TRUWAVE PRESSURE MALE/FEMALE 72"

## (undated) DEVICE — POSITIONER FOAM EGG CRATE ULNAR 2PCS (PINK)

## (undated) DEVICE — SUT STAINLESS STEEL 5 18" CCS

## (undated) DEVICE — VENODYNE/SCD SLEEVE CALF MEDIUM

## (undated) DEVICE — SUT MONOCRYL 4-0 18" PS-2

## (undated) DEVICE — DRAIN CHANNEL 32FR ROUND HUBLESS FULL FLUTED

## (undated) DEVICE — TOURNIQUET SET 12FR (1 RED, 1 BLUE, 1 SNARE) 7"

## (undated) DEVICE — VENTING ADAPTER "Y" (RED/BLUE) 7.5"

## (undated) DEVICE — TUBING KIT FAST START ATF 40

## (undated) DEVICE — LAP PAD 18 X 18"

## (undated) DEVICE — DRSG KLING 6"

## (undated) DEVICE — DRSG OPSITE 13.75 X 4"

## (undated) DEVICE — CONNECTOR "Y" 3/8 X 1/4 X 3/8"

## (undated) DEVICE — SUT SILK 5-0 60" TIES

## (undated) DEVICE — AORTIC PUNCH 4.0MM LONG LENGTH HANDLE

## (undated) DEVICE — SYR IV FLUSH SALINE 10ML 30/TY

## (undated) DEVICE — SOL IRR POUR NS 0.9% 500ML

## (undated) DEVICE — DRAPE MAYO STAND 30"

## (undated) DEVICE — SOL NORMOSOL-R PH7.4 1000ML

## (undated) DEVICE — GLV 7.5 PROTEXIS (WHITE)

## (undated) DEVICE — SUT BOOT STANDARD (YELLOW) 5 PAIR

## (undated) DEVICE — SOL IRR BAG NS 0.9% 1000ML

## (undated) DEVICE — STABILIZER HAND ASSISTANT ATTACHMENT W STABLESOFT 2S

## (undated) DEVICE — SUCTION YANKAUER BULBOUS TIP NO VENT

## (undated) DEVICE — SAW BLADE MICROAIRE STERNUM 1X34X9.4MM

## (undated) DEVICE — SAW BLADE SYNVASIVE OSCILLATING

## (undated) DEVICE — SUMP PERICARDIAL 20FR 1/4" ADULT

## (undated) DEVICE — SAW BLADE MICROAIRE OSCILLATING LG 0.9X64X33.5MM

## (undated) DEVICE — SUT PLEDGET SOFT LARGE 3/8" X 3/16" X 1/16" X6

## (undated) DEVICE — Device

## (undated) DEVICE — SSH-EBUS LINEAR 1311057: Type: DURABLE MEDICAL EQUIPMENT

## (undated) DEVICE — DRAPE IOBAN 33" X 23"

## (undated) DEVICE — SYR LUER LOK 20CC

## (undated) DEVICE — GOWN SLEEVES

## (undated) DEVICE — CONNECTOR STRAIGHT 3/8 X 3/8"

## (undated) DEVICE — SUT PROLENE 6-0 30" C-1

## (undated) DEVICE — UNDERPAD LINEN SAVER 23 X 36"

## (undated) DEVICE — GETINGE VASOVIEW 7 ENDOSCOPIC VESSEL HARVESTING SYSTEM

## (undated) DEVICE — ADAPTOR DLP "Y" FEMALE ON SINGLE LEG 3.5" X 10"

## (undated) DEVICE — NDL COUNTER FOAM AND MAGNET 40-70

## (undated) DEVICE — DRSG DERMABOND PRINEO 22CM

## (undated) DEVICE — SOL IRR BAG NS 0.9% 3000ML

## (undated) DEVICE — GLV 8.5 PROTEXIS (WHITE)

## (undated) DEVICE — CONNECTOR CARDIAC 1:1 FOR HUBLESS DRAINS

## (undated) DEVICE — SOL IRR POUR H2O 250ML

## (undated) DEVICE — DRSG OPSITE 2.5 X 2"

## (undated) DEVICE — DRAPE 1/2 SHEET 40X57"

## (undated) DEVICE — DRSG ACE BANDAGE 6"

## (undated) DEVICE — SUT SOFSILK 0 18" TIES

## (undated) DEVICE — SUT PROLENE 8-0 24" BV175-6

## (undated) DEVICE — PACK IV START WITH CHG

## (undated) DEVICE — TISSUE STABILIZER MEDTRONIC OCTOPUS EVOLUTION

## (undated) DEVICE — BLADE SCALPEL SAFETYLOCK #11

## (undated) DEVICE — SUT DOUBLE 6 WIRE STERNAL

## (undated) DEVICE — OMNIPAQUE 300  30ML

## (undated) DEVICE — SUT VICRYL 1 36" CTX UNDYED

## (undated) DEVICE — SUT PROLENE 7-0 24" BV-1

## (undated) DEVICE — SUT VICRYL 3-0 27" CT-1

## (undated) DEVICE — GOWN TRIMAX LG

## (undated) DEVICE — DRAIN RESERVOIR FOR JACKSON PRATT 100CC CARDINAL

## (undated) DEVICE — GOWN TRIMAX XXL

## (undated) DEVICE — BLADE SCALPEL SAFETYLOCK #10

## (undated) DEVICE — PREP CHLORAPREP HI-LITE ORANGE 26ML

## (undated) DEVICE — DRAIN DRAINGE CHEST DRY ADL DUAL

## (undated) DEVICE — DVC CLEVERLOCK

## (undated) DEVICE — GLV 6.5 PROTEXIS (WHITE)

## (undated) DEVICE — SUT VICRYL 0 36" CTX UNDYED

## (undated) DEVICE — FILTER REINFUSION FOR SALVAGED BLOOD DISP

## (undated) DEVICE — DRSG DERMABOND PRINEO 60CM

## (undated) DEVICE — BLADE SCALPEL SAFETYLOCK #15

## (undated) DEVICE — MEDTRONIC URCHIN EVO HEART POSITIONER & CANISTER TUBING SET

## (undated) DEVICE — TUBING ALARIS PUMP MODULE NON-DEHP

## (undated) DEVICE — DRSG STERISTRIPS 0.5 X 4"

## (undated) DEVICE — FORCEP RADIAL JAW 4 W NDL 2.4MM 2.8MM 240CM ORANGE DISP

## (undated) DEVICE — STOPCOCK 4-WAY (BLUE) DISCOFIX SPIN-LOCK CONNECTOR

## (undated) DEVICE — DRAIN PLEUROVAC

## (undated) DEVICE — PACING CABLE (BROWN) A/V TEMP SCREW DOWN 12FT

## (undated) DEVICE — NDL HYPO SAFE 18G X 1.5" (PINK)